# Patient Record
Sex: FEMALE | Race: WHITE | NOT HISPANIC OR LATINO | Employment: OTHER | URBAN - NONMETROPOLITAN AREA
[De-identification: names, ages, dates, MRNs, and addresses within clinical notes are randomized per-mention and may not be internally consistent; named-entity substitution may affect disease eponyms.]

---

## 2017-06-13 ENCOUNTER — TRANSCRIBE ORDERS (OUTPATIENT)
Dept: ADMINISTRATIVE | Facility: HOSPITAL | Age: 62
End: 2017-06-13

## 2017-06-13 ENCOUNTER — LAB (OUTPATIENT)
Dept: LAB | Facility: HOSPITAL | Age: 62
End: 2017-06-13
Attending: INTERNAL MEDICINE

## 2017-06-13 DIAGNOSIS — Z79.899 ENCOUNTER FOR LONG-TERM (CURRENT) USE OF HIGH-RISK MEDICATION: Primary | ICD-10-CM

## 2017-06-13 DIAGNOSIS — Z79.899 ENCOUNTER FOR LONG-TERM (CURRENT) USE OF HIGH-RISK MEDICATION: ICD-10-CM

## 2017-06-13 LAB
ALBUMIN SERPL-MCNC: 4 G/DL (ref 3.4–4.8)
ALBUMIN/GLOB SERPL: 1.4 G/DL (ref 1.5–2.5)
ALP SERPL-CCNC: 99 U/L (ref 35–104)
ALT SERPL W P-5'-P-CCNC: 46 U/L (ref 10–36)
ANION GAP SERPL CALCULATED.3IONS-SCNC: 3.7 MMOL/L (ref 3.6–11.2)
AST SERPL-CCNC: 47 U/L (ref 10–30)
BASOPHILS # BLD AUTO: 0.05 10*3/MM3 (ref 0–0.3)
BASOPHILS NFR BLD AUTO: 0.9 % (ref 0–2)
BILIRUB SERPL-MCNC: 0.2 MG/DL (ref 0.2–1.8)
BUN BLD-MCNC: 13 MG/DL (ref 7–21)
BUN/CREAT SERPL: 13.7 (ref 7–25)
CALCIUM SPEC-SCNC: 9.7 MG/DL (ref 7.7–10)
CHLORIDE SERPL-SCNC: 106 MMOL/L (ref 99–112)
CO2 SERPL-SCNC: 30.3 MMOL/L (ref 24.3–31.9)
CREAT BLD-MCNC: 0.95 MG/DL (ref 0.43–1.29)
DEPRECATED RDW RBC AUTO: 54.4 FL (ref 37–54)
EOSINOPHIL # BLD AUTO: 0.51 10*3/MM3 (ref 0–0.7)
EOSINOPHIL NFR BLD AUTO: 9.2 % (ref 0–5)
ERYTHROCYTE [DISTWIDTH] IN BLOOD BY AUTOMATED COUNT: 17.3 % (ref 11.5–14.5)
GFR SERPL CREATININE-BSD FRML MDRD: 60 ML/MIN/1.73
GLOBULIN UR ELPH-MCNC: 2.9 GM/DL
GLUCOSE BLD-MCNC: 78 MG/DL (ref 70–110)
HCT VFR BLD AUTO: 35 % (ref 37–47)
HGB BLD-MCNC: 11.4 G/DL (ref 12–16)
IMM GRANULOCYTES # BLD: 0.01 10*3/MM3 (ref 0–0.03)
IMM GRANULOCYTES NFR BLD: 0.2 % (ref 0–0.5)
LYMPHOCYTES # BLD AUTO: 3.02 10*3/MM3 (ref 1–3)
LYMPHOCYTES NFR BLD AUTO: 54.6 % (ref 21–51)
MCH RBC QN AUTO: 28.3 PG (ref 27–33)
MCHC RBC AUTO-ENTMCNC: 32.6 G/DL (ref 33–37)
MCV RBC AUTO: 86.8 FL (ref 80–94)
MONOCYTES # BLD AUTO: 0.56 10*3/MM3 (ref 0.1–0.9)
MONOCYTES NFR BLD AUTO: 10.1 % (ref 0–10)
NEUTROPHILS # BLD AUTO: 1.38 10*3/MM3 (ref 1.4–6.5)
NEUTROPHILS NFR BLD AUTO: 25 % (ref 30–70)
OSMOLALITY SERPL CALC.SUM OF ELEC: 278.4 MOSM/KG (ref 273–305)
PLATELET # BLD AUTO: 407 10*3/MM3 (ref 130–400)
PMV BLD AUTO: 10.3 FL (ref 6–10)
POTASSIUM BLD-SCNC: 3.9 MMOL/L (ref 3.5–5.3)
PROT SERPL-MCNC: 6.9 G/DL (ref 6–8)
RBC # BLD AUTO: 4.03 10*6/MM3 (ref 4.2–5.4)
SODIUM BLD-SCNC: 140 MMOL/L (ref 135–153)
WBC NRBC COR # BLD: 5.53 10*3/MM3 (ref 4.5–12.5)

## 2017-06-13 PROCEDURE — 36415 COLL VENOUS BLD VENIPUNCTURE: CPT

## 2017-06-13 PROCEDURE — 85025 COMPLETE CBC W/AUTO DIFF WBC: CPT | Performed by: INTERNAL MEDICINE

## 2017-06-13 PROCEDURE — 80053 COMPREHEN METABOLIC PANEL: CPT | Performed by: INTERNAL MEDICINE

## 2017-07-11 ENCOUNTER — LAB (OUTPATIENT)
Dept: LAB | Facility: HOSPITAL | Age: 62
End: 2017-07-11
Attending: INTERNAL MEDICINE

## 2017-07-11 ENCOUNTER — TRANSCRIBE ORDERS (OUTPATIENT)
Dept: ADMINISTRATIVE | Facility: HOSPITAL | Age: 62
End: 2017-07-11

## 2017-07-11 DIAGNOSIS — Z79.899 DRUG THERAPY: ICD-10-CM

## 2017-07-11 DIAGNOSIS — M05.79 RHEUMATOID ARTHRITIS, SEROPOSITIVE, MULTIPLE SITES (HCC): Primary | ICD-10-CM

## 2017-07-11 DIAGNOSIS — M05.79 RHEUMATOID ARTHRITIS, SEROPOSITIVE, MULTIPLE SITES (HCC): ICD-10-CM

## 2017-07-11 LAB
ALBUMIN SERPL-MCNC: 4.2 G/DL (ref 3.4–4.8)
ALBUMIN/GLOB SERPL: 1.6 G/DL (ref 1.5–2.5)
ALP SERPL-CCNC: 97 U/L (ref 35–104)
ALT SERPL W P-5'-P-CCNC: 22 U/L (ref 10–36)
ANION GAP SERPL CALCULATED.3IONS-SCNC: 3.6 MMOL/L (ref 3.6–11.2)
AST SERPL-CCNC: 27 U/L (ref 10–30)
BASOPHILS # BLD AUTO: 0.05 10*3/MM3 (ref 0–0.3)
BASOPHILS NFR BLD AUTO: 1.3 % (ref 0–2)
BILIRUB SERPL-MCNC: 0.2 MG/DL (ref 0.2–1.8)
BUN BLD-MCNC: 14 MG/DL (ref 7–21)
BUN/CREAT SERPL: 14.4 (ref 7–25)
CALCIUM SPEC-SCNC: 9.4 MG/DL (ref 7.7–10)
CHLORIDE SERPL-SCNC: 110 MMOL/L (ref 99–112)
CO2 SERPL-SCNC: 29.4 MMOL/L (ref 24.3–31.9)
CREAT BLD-MCNC: 0.97 MG/DL (ref 0.43–1.29)
DEPRECATED RDW RBC AUTO: 56.2 FL (ref 37–54)
EOSINOPHIL # BLD AUTO: 0.38 10*3/MM3 (ref 0–0.7)
EOSINOPHIL NFR BLD AUTO: 9.5 % (ref 0–5)
ERYTHROCYTE [DISTWIDTH] IN BLOOD BY AUTOMATED COUNT: 17.3 % (ref 11.5–14.5)
GFR SERPL CREATININE-BSD FRML MDRD: 59 ML/MIN/1.73
GLOBULIN UR ELPH-MCNC: 2.7 GM/DL
GLUCOSE BLD-MCNC: 96 MG/DL (ref 70–110)
HCT VFR BLD AUTO: 34.8 % (ref 37–47)
HGB BLD-MCNC: 11.2 G/DL (ref 12–16)
IMM GRANULOCYTES # BLD: 0 10*3/MM3 (ref 0–0.03)
IMM GRANULOCYTES NFR BLD: 0 % (ref 0–0.5)
LYMPHOCYTES # BLD AUTO: 1.74 10*3/MM3 (ref 1–3)
LYMPHOCYTES NFR BLD AUTO: 43.5 % (ref 21–51)
MCH RBC QN AUTO: 28.4 PG (ref 27–33)
MCHC RBC AUTO-ENTMCNC: 32.2 G/DL (ref 33–37)
MCV RBC AUTO: 88.1 FL (ref 80–94)
MONOCYTES # BLD AUTO: 0.39 10*3/MM3 (ref 0.1–0.9)
MONOCYTES NFR BLD AUTO: 9.8 % (ref 0–10)
NEUTROPHILS # BLD AUTO: 1.44 10*3/MM3 (ref 1.4–6.5)
NEUTROPHILS NFR BLD AUTO: 35.9 % (ref 30–70)
OSMOLALITY SERPL CALC.SUM OF ELEC: 285.3 MOSM/KG (ref 273–305)
PLATELET # BLD AUTO: 494 10*3/MM3 (ref 130–400)
PMV BLD AUTO: 10.2 FL (ref 6–10)
POTASSIUM BLD-SCNC: 4.5 MMOL/L (ref 3.5–5.3)
PROT SERPL-MCNC: 6.9 G/DL (ref 6–8)
RBC # BLD AUTO: 3.95 10*6/MM3 (ref 4.2–5.4)
SODIUM BLD-SCNC: 143 MMOL/L (ref 135–153)
WBC NRBC COR # BLD: 4 10*3/MM3 (ref 4.5–12.5)

## 2017-07-11 PROCEDURE — 80053 COMPREHEN METABOLIC PANEL: CPT | Performed by: INTERNAL MEDICINE

## 2017-07-11 PROCEDURE — 36415 COLL VENOUS BLD VENIPUNCTURE: CPT

## 2017-07-11 PROCEDURE — 85025 COMPLETE CBC W/AUTO DIFF WBC: CPT | Performed by: INTERNAL MEDICINE

## 2017-08-13 ENCOUNTER — APPOINTMENT (OUTPATIENT)
Dept: GENERAL RADIOLOGY | Facility: HOSPITAL | Age: 62
End: 2017-08-13

## 2017-08-13 ENCOUNTER — HOSPITAL ENCOUNTER (EMERGENCY)
Facility: HOSPITAL | Age: 62
Discharge: HOME | End: 2017-08-13
Attending: EMERGENCY MEDICINE | Admitting: EMERGENCY MEDICINE

## 2017-08-13 VITALS
HEART RATE: 75 BPM | DIASTOLIC BLOOD PRESSURE: 70 MMHG | SYSTOLIC BLOOD PRESSURE: 136 MMHG | RESPIRATION RATE: 18 BRPM | HEIGHT: 66 IN | WEIGHT: 191 LBS | TEMPERATURE: 98.3 F | BODY MASS INDEX: 30.7 KG/M2 | OXYGEN SATURATION: 98 %

## 2017-08-13 DIAGNOSIS — S69.91XA HAND INJURY, RIGHT, INITIAL ENCOUNTER: Primary | ICD-10-CM

## 2017-08-13 PROCEDURE — 73130 X-RAY EXAM OF HAND: CPT | Performed by: RADIOLOGY

## 2017-08-13 PROCEDURE — 25010000002 METHYLPREDNISOLONE PER 125 MG: Performed by: PHYSICIAN ASSISTANT

## 2017-08-13 PROCEDURE — 99283 EMERGENCY DEPT VISIT LOW MDM: CPT

## 2017-08-13 PROCEDURE — 73130 X-RAY EXAM OF HAND: CPT

## 2017-08-13 PROCEDURE — 96372 THER/PROPH/DIAG INJ SC/IM: CPT

## 2017-08-13 RX ORDER — METHYLPREDNISOLONE SODIUM SUCCINATE 125 MG/2ML
60 INJECTION, POWDER, LYOPHILIZED, FOR SOLUTION INTRAMUSCULAR; INTRAVENOUS ONCE
Status: COMPLETED | OUTPATIENT
Start: 2017-08-13 | End: 2017-08-13

## 2017-08-13 RX ADMIN — METHYLPREDNISOLONE SODIUM SUCCINATE 60 MG: 125 INJECTION, POWDER, FOR SOLUTION INTRAMUSCULAR; INTRAVENOUS at 16:45

## 2017-08-13 NOTE — ED NOTES
Patient reports she was cleaning her bath tub when she attempted to stand up and push herself up with her right hand. Patient reports pain in right hand and fingers since incident last night. Provider aware.      Maira Broussard RN  08/13/17 [id number]

## 2017-08-13 NOTE — ED PROVIDER NOTES
Subjective   Patient is a 62 y.o. female presenting with upper extremity pain.   History provided by:  Patient  Upper Extremity Issue   Location:  Hand  Hand location:  R hand  Injury: yes    Time since incident:  1 day  Mechanism of injury comment:  Pushing herself up from bathroom floor.   Pain details:     Quality:  Aching and throbbing    Radiates to:  Does not radiate    Severity:  Moderate    Onset quality:  Sudden    Timing:  Constant    Progression:  Worsening  Dislocation: no    Prior injury to area:  Yes (chronic osteo / rheumatoid arthritis. )  Relieved by:  Nothing  Ineffective treatments:  None tried  Associated symptoms: decreased range of motion and swelling    Associated symptoms: no fever        Review of Systems   Constitutional: Negative.  Negative for fever.   HENT: Negative.    Respiratory: Negative.    Cardiovascular: Negative.  Negative for chest pain.   Gastrointestinal: Negative.  Negative for abdominal pain.   Endocrine: Negative.    Genitourinary: Negative.  Negative for dysuria.   Skin: Negative.    Neurological: Negative.    Psychiatric/Behavioral: Negative.    All other systems reviewed and are negative.      Past Medical History:   Diagnosis Date   • Disease of thyroid gland    • Hiatal hernia    • Hyperlipidemia    • Injury of back    • Neuropathy        Allergies   Allergen Reactions   • Sulfa Antibiotics    • Ultram [Tramadol Hcl]        Past Surgical History:   Procedure Laterality Date   • BACK SURGERY     • BLADDER SURGERY     • HERNIA REPAIR     • HYSTERECTOMY     • NECK SURGERY         Family History   Problem Relation Age of Onset   • Breast cancer Mother    • Breast cancer Sister        Social History     Social History   • Marital status:      Spouse name: N/A   • Number of children: N/A   • Years of education: N/A     Social History Main Topics   • Smoking status: Never Smoker   • Smokeless tobacco: Not on file   • Alcohol use No   • Drug use: No   • Sexual  activity: Not on file     Other Topics Concern   • Not on file     Social History Narrative           Objective   Physical Exam   Constitutional: She is oriented to person, place, and time. She appears well-developed and well-nourished. No distress.   HENT:   Head: Normocephalic and atraumatic.   Nose: Nose normal.   Eyes: Conjunctivae and EOM are normal. Pupils are equal, round, and reactive to light.   Neck: Normal range of motion. Neck supple. No JVD present. No tracheal deviation present.   Cardiovascular: Normal rate, regular rhythm and normal heart sounds.    No murmur heard.  Pulmonary/Chest: Effort normal and breath sounds normal. No respiratory distress. She has no wheezes.   Abdominal: Soft. Bowel sounds are normal. There is no tenderness.   Musculoskeletal: She exhibits tenderness. She exhibits no edema or deformity.   Pt is tender to palpation to II III metacarpal, very mild discomfort to distal phalanx of II III right hand.    Neurological: She is alert and oriented to person, place, and time. No cranial nerve deficit.   Skin: Skin is warm and dry. No rash noted. She is not diaphoretic. No erythema. No pallor.   Psychiatric: She has a normal mood and affect. Her behavior is normal. Thought content normal.   Nursing note and vitals reviewed.      Procedures         ED Course  ED Course   Comment By Time   XR reviewed by Dr. Schmitt:  No apparent acute bony abnormality. SHANNON Baird 08/13 1615                  MDM  Number of Diagnoses or Management Options  minor     Amount and/or Complexity of Data Reviewed  Tests in the radiology section of CPT®: ordered and reviewed  Discuss the patient with other providers: yes    Risk of Complications, Morbidity, and/or Mortality  Presenting problems: low  Diagnostic procedures: low  Management options: low    Patient Progress  Patient progress: stable      Final diagnoses:   Hand injury, right, initial encounter            SHANNON Baird  08/13/17 1628

## 2017-08-18 ENCOUNTER — LAB (OUTPATIENT)
Dept: LAB | Facility: HOSPITAL | Age: 62
End: 2017-08-18
Attending: INTERNAL MEDICINE

## 2017-08-18 ENCOUNTER — TRANSCRIBE ORDERS (OUTPATIENT)
Dept: ADMINISTRATIVE | Facility: HOSPITAL | Age: 62
End: 2017-08-18

## 2017-08-18 DIAGNOSIS — M05.79 SEROPOSITIVE RHEUMATOID ARTHRITIS OF MULTIPLE SITES (HCC): ICD-10-CM

## 2017-08-18 DIAGNOSIS — Z79.899 POLYPHARMACY: ICD-10-CM

## 2017-08-18 DIAGNOSIS — M05.79 SEROPOSITIVE RHEUMATOID ARTHRITIS OF MULTIPLE SITES (HCC): Primary | ICD-10-CM

## 2017-08-18 LAB
ALBUMIN SERPL-MCNC: 4.5 G/DL (ref 3.4–4.8)
ALBUMIN/GLOB SERPL: 1.6 G/DL (ref 1.5–2.5)
ALP SERPL-CCNC: 103 U/L (ref 35–104)
ALT SERPL W P-5'-P-CCNC: 20 U/L (ref 10–36)
ANION GAP SERPL CALCULATED.3IONS-SCNC: 3.4 MMOL/L (ref 3.6–11.2)
AST SERPL-CCNC: 24 U/L (ref 10–30)
BASOPHILS # BLD AUTO: 0.03 10*3/MM3 (ref 0–0.3)
BASOPHILS NFR BLD AUTO: 0.6 % (ref 0–2)
BILIRUB SERPL-MCNC: 0.2 MG/DL (ref 0.2–1.8)
BUN BLD-MCNC: 13 MG/DL (ref 7–21)
BUN/CREAT SERPL: 15.1 (ref 7–25)
CALCIUM SPEC-SCNC: 9.2 MG/DL (ref 7.7–10)
CHLORIDE SERPL-SCNC: 108 MMOL/L (ref 99–112)
CO2 SERPL-SCNC: 25.6 MMOL/L (ref 24.3–31.9)
CREAT BLD-MCNC: 0.86 MG/DL (ref 0.43–1.29)
DEPRECATED RDW RBC AUTO: 54.3 FL (ref 37–54)
EOSINOPHIL # BLD AUTO: 0.15 10*3/MM3 (ref 0–0.7)
EOSINOPHIL NFR BLD AUTO: 3.1 % (ref 0–5)
ERYTHROCYTE [DISTWIDTH] IN BLOOD BY AUTOMATED COUNT: 17.1 % (ref 11.5–14.5)
GFR SERPL CREATININE-BSD FRML MDRD: 67 ML/MIN/1.73
GLOBULIN UR ELPH-MCNC: 2.8 GM/DL
GLUCOSE BLD-MCNC: 107 MG/DL (ref 70–110)
HCT VFR BLD AUTO: 35.2 % (ref 37–47)
HGB BLD-MCNC: 11.2 G/DL (ref 12–16)
IMM GRANULOCYTES # BLD: 0 10*3/MM3 (ref 0–0.03)
IMM GRANULOCYTES NFR BLD: 0 % (ref 0–0.5)
LYMPHOCYTES # BLD AUTO: 1.63 10*3/MM3 (ref 1–3)
LYMPHOCYTES NFR BLD AUTO: 33.3 % (ref 21–51)
MCH RBC QN AUTO: 27.9 PG (ref 27–33)
MCHC RBC AUTO-ENTMCNC: 31.8 G/DL (ref 33–37)
MCV RBC AUTO: 87.8 FL (ref 80–94)
MONOCYTES # BLD AUTO: 0.28 10*3/MM3 (ref 0.1–0.9)
MONOCYTES NFR BLD AUTO: 5.7 % (ref 0–10)
NEUTROPHILS # BLD AUTO: 2.8 10*3/MM3 (ref 1.4–6.5)
NEUTROPHILS NFR BLD AUTO: 57.3 % (ref 30–70)
OSMOLALITY SERPL CALC.SUM OF ELEC: 274.4 MOSM/KG (ref 273–305)
PLATELET # BLD AUTO: 571 10*3/MM3 (ref 130–400)
PMV BLD AUTO: 9.7 FL (ref 6–10)
POTASSIUM BLD-SCNC: 4.9 MMOL/L (ref 3.5–5.3)
PROT SERPL-MCNC: 7.3 G/DL (ref 6–8)
RBC # BLD AUTO: 4.01 10*6/MM3 (ref 4.2–5.4)
SODIUM BLD-SCNC: 137 MMOL/L (ref 135–153)
WBC NRBC COR # BLD: 4.89 10*3/MM3 (ref 4.5–12.5)

## 2017-08-18 PROCEDURE — 80053 COMPREHEN METABOLIC PANEL: CPT | Performed by: INTERNAL MEDICINE

## 2017-08-18 PROCEDURE — 85025 COMPLETE CBC W/AUTO DIFF WBC: CPT | Performed by: INTERNAL MEDICINE

## 2017-08-18 PROCEDURE — 36415 COLL VENOUS BLD VENIPUNCTURE: CPT

## 2017-09-25 ENCOUNTER — TRANSCRIBE ORDERS (OUTPATIENT)
Dept: ADMINISTRATIVE | Facility: HOSPITAL | Age: 62
End: 2017-09-25

## 2017-09-25 ENCOUNTER — LAB (OUTPATIENT)
Dept: LAB | Facility: HOSPITAL | Age: 62
End: 2017-09-25
Attending: INTERNAL MEDICINE

## 2017-09-25 DIAGNOSIS — M05.79 SEROPOSITIVE RHEUMATOID ARTHRITIS OF MULTIPLE SITES (HCC): ICD-10-CM

## 2017-09-25 DIAGNOSIS — Z79.899 DRUG THERAPY: ICD-10-CM

## 2017-09-25 DIAGNOSIS — M05.79 SEROPOSITIVE RHEUMATOID ARTHRITIS OF MULTIPLE SITES (HCC): Primary | ICD-10-CM

## 2017-09-25 LAB
ALBUMIN SERPL-MCNC: 4.1 G/DL (ref 3.4–4.8)
ALBUMIN/GLOB SERPL: 1.7 G/DL (ref 1.5–2.5)
ALP SERPL-CCNC: 98 U/L (ref 35–104)
ALT SERPL W P-5'-P-CCNC: 25 U/L (ref 10–36)
ANION GAP SERPL CALCULATED.3IONS-SCNC: 3.9 MMOL/L (ref 3.6–11.2)
AST SERPL-CCNC: 35 U/L (ref 10–30)
BASOPHILS # BLD AUTO: 0.08 10*3/MM3 (ref 0–0.3)
BASOPHILS NFR BLD AUTO: 1.3 % (ref 0–2)
BILIRUB SERPL-MCNC: 0.2 MG/DL (ref 0.2–1.8)
BUN BLD-MCNC: 14 MG/DL (ref 7–21)
BUN/CREAT SERPL: 13.6 (ref 7–25)
CALCIUM SPEC-SCNC: 9.2 MG/DL (ref 7.7–10)
CHLORIDE SERPL-SCNC: 107 MMOL/L (ref 99–112)
CO2 SERPL-SCNC: 29.1 MMOL/L (ref 24.3–31.9)
CREAT BLD-MCNC: 1.03 MG/DL (ref 0.43–1.29)
DEPRECATED RDW RBC AUTO: 54.6 FL (ref 37–54)
EOSINOPHIL # BLD AUTO: 0.3 10*3/MM3 (ref 0–0.7)
EOSINOPHIL NFR BLD AUTO: 4.9 % (ref 0–5)
ERYTHROCYTE [DISTWIDTH] IN BLOOD BY AUTOMATED COUNT: 16.7 % (ref 11.5–14.5)
GFR SERPL CREATININE-BSD FRML MDRD: 55 ML/MIN/1.73
GLOBULIN UR ELPH-MCNC: 2.4 GM/DL
GLUCOSE BLD-MCNC: 87 MG/DL (ref 70–110)
HCT VFR BLD AUTO: 32.1 % (ref 37–47)
HGB BLD-MCNC: 10.2 G/DL (ref 12–16)
IMM GRANULOCYTES # BLD: 0.01 10*3/MM3 (ref 0–0.03)
IMM GRANULOCYTES NFR BLD: 0.2 % (ref 0–0.5)
LYMPHOCYTES # BLD AUTO: 3.21 10*3/MM3 (ref 1–3)
LYMPHOCYTES NFR BLD AUTO: 52.2 % (ref 21–51)
MCH RBC QN AUTO: 28.5 PG (ref 27–33)
MCHC RBC AUTO-ENTMCNC: 31.8 G/DL (ref 33–37)
MCV RBC AUTO: 89.7 FL (ref 80–94)
MONOCYTES # BLD AUTO: 0.37 10*3/MM3 (ref 0.1–0.9)
MONOCYTES NFR BLD AUTO: 6 % (ref 0–10)
NEUTROPHILS # BLD AUTO: 2.18 10*3/MM3 (ref 1.4–6.5)
NEUTROPHILS NFR BLD AUTO: 35.4 % (ref 30–70)
OSMOLALITY SERPL CALC.SUM OF ELEC: 279.2 MOSM/KG (ref 273–305)
PLATELET # BLD AUTO: 461 10*3/MM3 (ref 130–400)
PMV BLD AUTO: 10.2 FL (ref 6–10)
POTASSIUM BLD-SCNC: 4.3 MMOL/L (ref 3.5–5.3)
PROT SERPL-MCNC: 6.5 G/DL (ref 6–8)
RBC # BLD AUTO: 3.58 10*6/MM3 (ref 4.2–5.4)
SODIUM BLD-SCNC: 140 MMOL/L (ref 135–153)
WBC NRBC COR # BLD: 6.15 10*3/MM3 (ref 4.5–12.5)

## 2017-09-25 PROCEDURE — 80053 COMPREHEN METABOLIC PANEL: CPT | Performed by: INTERNAL MEDICINE

## 2017-09-25 PROCEDURE — 36415 COLL VENOUS BLD VENIPUNCTURE: CPT

## 2017-09-25 PROCEDURE — 85025 COMPLETE CBC W/AUTO DIFF WBC: CPT | Performed by: INTERNAL MEDICINE

## 2017-10-24 ENCOUNTER — TRANSCRIBE ORDERS (OUTPATIENT)
Dept: ADMINISTRATIVE | Facility: HOSPITAL | Age: 62
End: 2017-10-24

## 2017-10-24 DIAGNOSIS — Z12.31 VISIT FOR SCREENING MAMMOGRAM: Primary | ICD-10-CM

## 2017-11-13 ENCOUNTER — HOSPITAL ENCOUNTER (OUTPATIENT)
Dept: MAMMOGRAPHY | Facility: HOSPITAL | Age: 62
Discharge: HOME | End: 2017-11-13
Admitting: OBSTETRICS & GYNECOLOGY

## 2017-11-13 DIAGNOSIS — Z12.31 VISIT FOR SCREENING MAMMOGRAM: ICD-10-CM

## 2017-11-13 PROCEDURE — 77063 BREAST TOMOSYNTHESIS BI: CPT | Performed by: RADIOLOGY

## 2017-11-13 PROCEDURE — G0202 SCR MAMMO BI INCL CAD: HCPCS

## 2017-11-13 PROCEDURE — G0202 SCR MAMMO BI INCL CAD: HCPCS | Performed by: RADIOLOGY

## 2017-11-13 PROCEDURE — 77063 BREAST TOMOSYNTHESIS BI: CPT

## 2018-01-02 ENCOUNTER — TRANSCRIBE ORDERS (OUTPATIENT)
Dept: ADMINISTRATIVE | Facility: HOSPITAL | Age: 63
End: 2018-01-02

## 2018-01-02 ENCOUNTER — LAB (OUTPATIENT)
Dept: LAB | Facility: HOSPITAL | Age: 63
End: 2018-01-02
Attending: INTERNAL MEDICINE

## 2018-01-02 DIAGNOSIS — Z79.899 ENCOUNTER FOR LONG-TERM (CURRENT) USE OF HIGH-RISK MEDICATION: ICD-10-CM

## 2018-01-02 DIAGNOSIS — Z79.899 ENCOUNTER FOR LONG-TERM (CURRENT) USE OF HIGH-RISK MEDICATION: Primary | ICD-10-CM

## 2018-01-02 LAB
ALBUMIN SERPL-MCNC: 4.3 G/DL (ref 3.4–4.8)
ALBUMIN/GLOB SERPL: 1.5 G/DL (ref 1.5–2.5)
ALP SERPL-CCNC: 104 U/L (ref 35–104)
ALT SERPL W P-5'-P-CCNC: 29 U/L (ref 10–36)
ANION GAP SERPL CALCULATED.3IONS-SCNC: 6.5 MMOL/L (ref 3.6–11.2)
AST SERPL-CCNC: 27 U/L (ref 10–30)
BASOPHILS # BLD AUTO: 0.07 10*3/MM3 (ref 0–0.3)
BASOPHILS NFR BLD AUTO: 1.1 % (ref 0–2)
BILIRUB SERPL-MCNC: 0.2 MG/DL (ref 0.2–1.8)
BUN BLD-MCNC: 14 MG/DL (ref 7–21)
BUN/CREAT SERPL: 14.7 (ref 7–25)
CALCIUM SPEC-SCNC: 9.2 MG/DL (ref 7.7–10)
CHLORIDE SERPL-SCNC: 109 MMOL/L (ref 99–112)
CO2 SERPL-SCNC: 25.5 MMOL/L (ref 24.3–31.9)
CREAT BLD-MCNC: 0.95 MG/DL (ref 0.43–1.29)
DEPRECATED RDW RBC AUTO: 56.4 FL (ref 37–54)
EOSINOPHIL # BLD AUTO: 0.26 10*3/MM3 (ref 0–0.7)
EOSINOPHIL NFR BLD AUTO: 4.1 % (ref 0–5)
ERYTHROCYTE [DISTWIDTH] IN BLOOD BY AUTOMATED COUNT: 17.4 % (ref 11.5–14.5)
GFR SERPL CREATININE-BSD FRML MDRD: 60 ML/MIN/1.73
GLOBULIN UR ELPH-MCNC: 2.8 GM/DL
GLUCOSE BLD-MCNC: 92 MG/DL (ref 70–110)
HCT VFR BLD AUTO: 34.9 % (ref 37–47)
HGB BLD-MCNC: 11.2 G/DL (ref 12–16)
IMM GRANULOCYTES # BLD: 0 10*3/MM3 (ref 0–0.03)
IMM GRANULOCYTES NFR BLD: 0 % (ref 0–0.5)
LYMPHOCYTES # BLD AUTO: 3.37 10*3/MM3 (ref 1–3)
LYMPHOCYTES NFR BLD AUTO: 52.9 % (ref 21–51)
MCH RBC QN AUTO: 28.4 PG (ref 27–33)
MCHC RBC AUTO-ENTMCNC: 32.1 G/DL (ref 33–37)
MCV RBC AUTO: 88.6 FL (ref 80–94)
MONOCYTES # BLD AUTO: 0.53 10*3/MM3 (ref 0.1–0.9)
MONOCYTES NFR BLD AUTO: 8.3 % (ref 0–10)
NEUTROPHILS # BLD AUTO: 2.14 10*3/MM3 (ref 1.4–6.5)
NEUTROPHILS NFR BLD AUTO: 33.6 % (ref 30–70)
OSMOLALITY SERPL CALC.SUM OF ELEC: 281.4 MOSM/KG (ref 273–305)
PLATELET # BLD AUTO: 411 10*3/MM3 (ref 130–400)
PMV BLD AUTO: 10.3 FL (ref 6–10)
POTASSIUM BLD-SCNC: 4.4 MMOL/L (ref 3.5–5.3)
PROT SERPL-MCNC: 7.1 G/DL (ref 6–8)
RBC # BLD AUTO: 3.94 10*6/MM3 (ref 4.2–5.4)
SODIUM BLD-SCNC: 141 MMOL/L (ref 135–153)
WBC NRBC COR # BLD: 6.37 10*3/MM3 (ref 4.5–12.5)

## 2018-01-02 PROCEDURE — 85025 COMPLETE CBC W/AUTO DIFF WBC: CPT

## 2018-01-02 PROCEDURE — 80053 COMPREHEN METABOLIC PANEL: CPT

## 2018-01-02 PROCEDURE — 36415 COLL VENOUS BLD VENIPUNCTURE: CPT

## 2018-03-09 ENCOUNTER — LAB (OUTPATIENT)
Dept: LAB | Facility: HOSPITAL | Age: 63
End: 2018-03-09
Attending: INTERNAL MEDICINE

## 2018-03-09 ENCOUNTER — TRANSCRIBE ORDERS (OUTPATIENT)
Dept: ADMINISTRATIVE | Facility: HOSPITAL | Age: 63
End: 2018-03-09

## 2018-03-09 DIAGNOSIS — Z79.899 ENCOUNTER FOR LONG-TERM (CURRENT) USE OF HIGH-RISK MEDICATION: ICD-10-CM

## 2018-03-09 DIAGNOSIS — Z79.899 ENCOUNTER FOR LONG-TERM (CURRENT) USE OF HIGH-RISK MEDICATION: Primary | ICD-10-CM

## 2018-03-09 LAB
ALBUMIN SERPL-MCNC: 4.2 G/DL (ref 3.4–4.8)
ALBUMIN/GLOB SERPL: 1.7 G/DL (ref 1.5–2.5)
ALP SERPL-CCNC: 101 U/L (ref 35–104)
ALT SERPL W P-5'-P-CCNC: 22 U/L (ref 10–36)
ANION GAP SERPL CALCULATED.3IONS-SCNC: 4.9 MMOL/L (ref 3.6–11.2)
AST SERPL-CCNC: 24 U/L (ref 10–30)
BASOPHILS # BLD AUTO: 0.06 10*3/MM3 (ref 0–0.3)
BASOPHILS NFR BLD AUTO: 1.1 % (ref 0–2)
BILIRUB SERPL-MCNC: 0.2 MG/DL (ref 0.2–1.8)
BUN BLD-MCNC: 14 MG/DL (ref 7–21)
BUN/CREAT SERPL: 15.1 (ref 7–25)
CALCIUM SPEC-SCNC: 9 MG/DL (ref 7.7–10)
CHLORIDE SERPL-SCNC: 108 MMOL/L (ref 99–112)
CO2 SERPL-SCNC: 25.1 MMOL/L (ref 24.3–31.9)
CREAT BLD-MCNC: 0.93 MG/DL (ref 0.43–1.29)
DEPRECATED RDW RBC AUTO: 60 FL (ref 37–54)
EOSINOPHIL # BLD AUTO: 0.44 10*3/MM3 (ref 0–0.7)
EOSINOPHIL NFR BLD AUTO: 7.7 % (ref 0–5)
ERYTHROCYTE [DISTWIDTH] IN BLOOD BY AUTOMATED COUNT: 18.8 % (ref 11.5–14.5)
GFR SERPL CREATININE-BSD FRML MDRD: 61 ML/MIN/1.73
GLOBULIN UR ELPH-MCNC: 2.5 GM/DL
GLUCOSE BLD-MCNC: 85 MG/DL (ref 70–110)
HCT VFR BLD AUTO: 34.2 % (ref 37–47)
HGB BLD-MCNC: 10.9 G/DL (ref 12–16)
IMM GRANULOCYTES # BLD: 0.01 10*3/MM3 (ref 0–0.03)
IMM GRANULOCYTES NFR BLD: 0.2 % (ref 0–0.5)
LYMPHOCYTES # BLD AUTO: 2.7 10*3/MM3 (ref 1–3)
LYMPHOCYTES NFR BLD AUTO: 47.5 % (ref 21–51)
MCH RBC QN AUTO: 28.7 PG (ref 27–33)
MCHC RBC AUTO-ENTMCNC: 31.9 G/DL (ref 33–37)
MCV RBC AUTO: 90 FL (ref 80–94)
MONOCYTES # BLD AUTO: 0.64 10*3/MM3 (ref 0.1–0.9)
MONOCYTES NFR BLD AUTO: 11.2 % (ref 0–10)
NEUTROPHILS # BLD AUTO: 1.84 10*3/MM3 (ref 1.4–6.5)
NEUTROPHILS NFR BLD AUTO: 32.3 % (ref 30–70)
OSMOLALITY SERPL CALC.SUM OF ELEC: 275.4 MOSM/KG (ref 273–305)
PLATELET # BLD AUTO: 383 10*3/MM3 (ref 130–400)
PMV BLD AUTO: 10.4 FL (ref 6–10)
POTASSIUM BLD-SCNC: 4.4 MMOL/L (ref 3.5–5.3)
PROT SERPL-MCNC: 6.7 G/DL (ref 6–8)
RBC # BLD AUTO: 3.8 10*6/MM3 (ref 4.2–5.4)
SODIUM BLD-SCNC: 138 MMOL/L (ref 135–153)
WBC NRBC COR # BLD: 5.69 10*3/MM3 (ref 4.5–12.5)

## 2018-03-09 PROCEDURE — 85025 COMPLETE CBC W/AUTO DIFF WBC: CPT

## 2018-03-09 PROCEDURE — 80053 COMPREHEN METABOLIC PANEL: CPT

## 2018-06-12 ENCOUNTER — TRANSCRIBE ORDERS (OUTPATIENT)
Dept: ADMINISTRATIVE | Facility: HOSPITAL | Age: 63
End: 2018-06-12

## 2018-06-12 ENCOUNTER — LAB (OUTPATIENT)
Dept: LAB | Facility: HOSPITAL | Age: 63
End: 2018-06-12
Attending: INTERNAL MEDICINE

## 2018-06-12 DIAGNOSIS — M06.4 INFLAMMATORY POLYARTHROPATHY (HCC): ICD-10-CM

## 2018-06-12 DIAGNOSIS — Z79.899 DRUG THERAPY: ICD-10-CM

## 2018-06-12 DIAGNOSIS — M06.4 INFLAMMATORY POLYARTHROPATHY (HCC): Primary | ICD-10-CM

## 2018-06-12 LAB
ALBUMIN SERPL-MCNC: 4.4 G/DL (ref 3.4–4.8)
ALBUMIN/GLOB SERPL: 1.8 G/DL (ref 1.5–2.5)
ALP SERPL-CCNC: 121 U/L (ref 35–104)
ALT SERPL W P-5'-P-CCNC: 29 U/L (ref 10–36)
ANION GAP SERPL CALCULATED.3IONS-SCNC: 6.8 MMOL/L (ref 3.6–11.2)
AST SERPL-CCNC: 29 U/L (ref 10–30)
BASOPHILS # BLD AUTO: 0.05 10*3/MM3 (ref 0–0.3)
BASOPHILS NFR BLD AUTO: 0.8 % (ref 0–2)
BILIRUB SERPL-MCNC: 0.2 MG/DL (ref 0.2–1.8)
BUN BLD-MCNC: 12 MG/DL (ref 7–21)
BUN/CREAT SERPL: 13.3 (ref 7–25)
CALCIUM SPEC-SCNC: 9.2 MG/DL (ref 7.7–10)
CHLORIDE SERPL-SCNC: 110 MMOL/L (ref 99–112)
CO2 SERPL-SCNC: 25.2 MMOL/L (ref 24.3–31.9)
CREAT BLD-MCNC: 0.9 MG/DL (ref 0.43–1.29)
DEPRECATED RDW RBC AUTO: 53.5 FL (ref 37–54)
EOSINOPHIL # BLD AUTO: 0.22 10*3/MM3 (ref 0–0.7)
EOSINOPHIL NFR BLD AUTO: 3.7 % (ref 0–5)
ERYTHROCYTE [DISTWIDTH] IN BLOOD BY AUTOMATED COUNT: 16.6 % (ref 11.5–14.5)
GFR SERPL CREATININE-BSD FRML MDRD: 64 ML/MIN/1.73
GLOBULIN UR ELPH-MCNC: 2.5 GM/DL
GLUCOSE BLD-MCNC: 87 MG/DL (ref 70–110)
HCT VFR BLD AUTO: 35.6 % (ref 37–47)
HGB BLD-MCNC: 11.7 G/DL (ref 12–16)
IMM GRANULOCYTES # BLD: 0.01 10*3/MM3 (ref 0–0.03)
IMM GRANULOCYTES NFR BLD: 0.2 % (ref 0–0.5)
LYMPHOCYTES # BLD AUTO: 2.24 10*3/MM3 (ref 1–3)
LYMPHOCYTES NFR BLD AUTO: 37.9 % (ref 21–51)
MCH RBC QN AUTO: 29.9 PG (ref 27–33)
MCHC RBC AUTO-ENTMCNC: 32.9 G/DL (ref 33–37)
MCV RBC AUTO: 91 FL (ref 80–94)
MONOCYTES # BLD AUTO: 0.54 10*3/MM3 (ref 0.1–0.9)
MONOCYTES NFR BLD AUTO: 9.1 % (ref 0–10)
NEUTROPHILS # BLD AUTO: 2.85 10*3/MM3 (ref 1.4–6.5)
NEUTROPHILS NFR BLD AUTO: 48.3 % (ref 30–70)
OSMOLALITY SERPL CALC.SUM OF ELEC: 282.2 MOSM/KG (ref 273–305)
PLATELET # BLD AUTO: 455 10*3/MM3 (ref 130–400)
PMV BLD AUTO: 10.7 FL (ref 6–10)
POTASSIUM BLD-SCNC: 4.3 MMOL/L (ref 3.5–5.3)
PROT SERPL-MCNC: 6.9 G/DL (ref 6–8)
RBC # BLD AUTO: 3.91 10*6/MM3 (ref 4.2–5.4)
SODIUM BLD-SCNC: 142 MMOL/L (ref 135–153)
WBC NRBC COR # BLD: 5.91 10*3/MM3 (ref 4.5–12.5)

## 2018-06-12 PROCEDURE — 80053 COMPREHEN METABOLIC PANEL: CPT

## 2018-06-12 PROCEDURE — 85025 COMPLETE CBC W/AUTO DIFF WBC: CPT

## 2018-06-12 PROCEDURE — 36415 COLL VENOUS BLD VENIPUNCTURE: CPT

## 2018-09-04 ENCOUNTER — APPOINTMENT (OUTPATIENT)
Dept: LAB | Facility: HOSPITAL | Age: 63
End: 2018-09-04
Attending: INTERNAL MEDICINE

## 2018-09-04 ENCOUNTER — TRANSCRIBE ORDERS (OUTPATIENT)
Dept: ADMINISTRATIVE | Facility: HOSPITAL | Age: 63
End: 2018-09-04

## 2018-09-04 DIAGNOSIS — M19.90 INFLAMMATORY ARTHRITIS: Primary | ICD-10-CM

## 2018-09-04 LAB
ALBUMIN SERPL-MCNC: 4.3 G/DL (ref 3.4–4.8)
ALBUMIN/GLOB SERPL: 1.7 G/DL (ref 1.5–2.5)
ALP SERPL-CCNC: 117 U/L (ref 35–104)
ALT SERPL W P-5'-P-CCNC: 51 U/L (ref 10–36)
ANION GAP SERPL CALCULATED.3IONS-SCNC: 5.9 MMOL/L (ref 3.6–11.2)
AST SERPL-CCNC: 38 U/L (ref 10–30)
BASOPHILS # BLD AUTO: 0.05 10*3/MM3 (ref 0–0.3)
BASOPHILS NFR BLD AUTO: 0.8 % (ref 0–2)
BILIRUB SERPL-MCNC: 0.2 MG/DL (ref 0.2–1.8)
BUN BLD-MCNC: 14 MG/DL (ref 7–21)
BUN/CREAT SERPL: 13.7 (ref 7–25)
CALCIUM SPEC-SCNC: 9.4 MG/DL (ref 7.7–10)
CHLORIDE SERPL-SCNC: 109 MMOL/L (ref 99–112)
CO2 SERPL-SCNC: 25.1 MMOL/L (ref 24.3–31.9)
CREAT BLD-MCNC: 1.02 MG/DL (ref 0.43–1.29)
DEPRECATED RDW RBC AUTO: 56.6 FL (ref 37–54)
EOSINOPHIL # BLD AUTO: 0.31 10*3/MM3 (ref 0–0.7)
EOSINOPHIL NFR BLD AUTO: 5.2 % (ref 0–5)
ERYTHROCYTE [DISTWIDTH] IN BLOOD BY AUTOMATED COUNT: 17.5 % (ref 11.5–14.5)
GFR SERPL CREATININE-BSD FRML MDRD: 55 ML/MIN/1.73
GLOBULIN UR ELPH-MCNC: 2.6 GM/DL
GLUCOSE BLD-MCNC: 86 MG/DL (ref 70–110)
HCT VFR BLD AUTO: 36.1 % (ref 37–47)
HGB BLD-MCNC: 11.8 G/DL (ref 12–16)
IMM GRANULOCYTES # BLD: 0 10*3/MM3 (ref 0–0.03)
IMM GRANULOCYTES NFR BLD: 0 % (ref 0–0.5)
LYMPHOCYTES # BLD AUTO: 2.82 10*3/MM3 (ref 1–3)
LYMPHOCYTES NFR BLD AUTO: 47 % (ref 21–51)
MCH RBC QN AUTO: 29.9 PG (ref 27–33)
MCHC RBC AUTO-ENTMCNC: 32.7 G/DL (ref 33–37)
MCV RBC AUTO: 91.4 FL (ref 80–94)
MONOCYTES # BLD AUTO: 0.59 10*3/MM3 (ref 0.1–0.9)
MONOCYTES NFR BLD AUTO: 9.8 % (ref 0–10)
NEUTROPHILS # BLD AUTO: 2.23 10*3/MM3 (ref 1.4–6.5)
NEUTROPHILS NFR BLD AUTO: 37.2 % (ref 30–70)
OSMOLALITY SERPL CALC.SUM OF ELEC: 279.2 MOSM/KG (ref 273–305)
PLATELET # BLD AUTO: 427 10*3/MM3 (ref 130–400)
PMV BLD AUTO: 10.5 FL (ref 6–10)
POTASSIUM BLD-SCNC: 4.2 MMOL/L (ref 3.5–5.3)
PROT SERPL-MCNC: 6.9 G/DL (ref 6–8)
RBC # BLD AUTO: 3.95 10*6/MM3 (ref 4.2–5.4)
SODIUM BLD-SCNC: 140 MMOL/L (ref 135–153)
WBC NRBC COR # BLD: 6 10*3/MM3 (ref 4.5–12.5)

## 2018-09-04 PROCEDURE — 36415 COLL VENOUS BLD VENIPUNCTURE: CPT | Performed by: INTERNAL MEDICINE

## 2018-09-04 PROCEDURE — 85025 COMPLETE CBC W/AUTO DIFF WBC: CPT | Performed by: INTERNAL MEDICINE

## 2018-09-04 PROCEDURE — 80053 COMPREHEN METABOLIC PANEL: CPT | Performed by: INTERNAL MEDICINE

## 2018-09-27 PROBLEM — J34.89 NASAL VALVE COLLAPSE: Status: ACTIVE | Noted: 2018-09-27

## 2018-09-27 PROBLEM — M95.0 NASAL VALVE COLLAPSE: Status: ACTIVE | Noted: 2018-09-27

## 2018-09-27 PROBLEM — J34.829 NASAL VALVE COLLAPSE: Status: ACTIVE | Noted: 2018-09-27

## 2018-09-27 PROBLEM — J34.2 NASAL SEPTAL DEVIATION: Status: ACTIVE | Noted: 2018-09-27

## 2018-09-27 PROBLEM — J34.3 HYPERTROPHY OF BOTH INFERIOR NASAL TURBINATES: Status: ACTIVE | Noted: 2018-09-27

## 2018-10-23 ENCOUNTER — APPOINTMENT (OUTPATIENT)
Dept: PREADMISSION TESTING | Facility: HOSPITAL | Age: 63
End: 2018-10-23

## 2018-10-23 LAB
ANION GAP SERPL CALCULATED.3IONS-SCNC: 7.3 MMOL/L (ref 3.6–11.2)
BUN BLD-MCNC: 17 MG/DL (ref 7–21)
BUN/CREAT SERPL: 17.7 (ref 7–25)
CALCIUM SPEC-SCNC: 9.5 MG/DL (ref 7.7–10)
CHLORIDE SERPL-SCNC: 111 MMOL/L (ref 99–112)
CO2 SERPL-SCNC: 22.7 MMOL/L (ref 24.3–31.9)
CREAT BLD-MCNC: 0.96 MG/DL (ref 0.43–1.29)
DEPRECATED RDW RBC AUTO: 54.5 FL (ref 37–54)
ERYTHROCYTE [DISTWIDTH] IN BLOOD BY AUTOMATED COUNT: 16.7 % (ref 11.5–14.5)
GFR SERPL CREATININE-BSD FRML MDRD: 59 ML/MIN/1.73
GLUCOSE BLD-MCNC: 60 MG/DL (ref 70–110)
HCT VFR BLD AUTO: 37.1 % (ref 37–47)
HGB BLD-MCNC: 12 G/DL (ref 12–16)
MCH RBC QN AUTO: 29.8 PG (ref 27–33)
MCHC RBC AUTO-ENTMCNC: 32.3 G/DL (ref 33–37)
MCV RBC AUTO: 92.1 FL (ref 80–94)
OSMOLALITY SERPL CALC.SUM OF ELEC: 280.7 MOSM/KG (ref 273–305)
PLATELET # BLD AUTO: 506 10*3/MM3 (ref 130–400)
PMV BLD AUTO: 10.5 FL (ref 6–10)
POTASSIUM BLD-SCNC: 3.5 MMOL/L (ref 3.5–5.3)
RBC # BLD AUTO: 4.03 10*6/MM3 (ref 4.2–5.4)
SODIUM BLD-SCNC: 141 MMOL/L (ref 135–153)
WBC NRBC COR # BLD: 5.82 10*3/MM3 (ref 4.5–12.5)

## 2018-10-23 PROCEDURE — 36415 COLL VENOUS BLD VENIPUNCTURE: CPT

## 2018-10-23 PROCEDURE — 85027 COMPLETE CBC AUTOMATED: CPT | Performed by: ANESTHESIOLOGY

## 2018-10-23 PROCEDURE — 80048 BASIC METABOLIC PNL TOTAL CA: CPT | Performed by: ANESTHESIOLOGY

## 2018-10-23 NOTE — DISCHARGE INSTRUCTIONS
0730-----10/24/2018    ARRIVAL TIME    TAKE the following medications the morning of surgery:  All heart or blood pressure medications    HOLD all diabetic medications the morning of surgery as ordered by physician.    Please discontinue all blood thinners and anticoagulants (except aspirin) prior to surgery as per your surgeon and cardiologist instructions.  Aspirin may be continued up to the day prior to surgery.         General Instructions:  · Do not eat or drink after midnight: includes water, mints, or gum. You may brush your teeth.  Dental appliances that are removable must be taken out day of surgery.  · Do not smoke, chew tobacco, or drink alcohol.  · Bring medications in original bottles, any inhalers and if applicable your C-PAP/BI-PAP machine.  · Bring any papers given to you in the doctor's office.  · Wear clean comfortable clothes and socks.  · Do not wear contact lenses or make-up. Bring a case for your glasses if applicable.  · Bring crutches or walker if applicable.  · Leave all other valuables and jewelry at home.    If you were given a blood bank ID arm band remember to bring it with you the day of surgery.    Preventing a Surgical Site Infection:  Shower the night before surgery (unless instructed other wise) using a fresh bar of anti-bacterial soap (such as Dial) and clean washcloth. Dry with a clean towel and dress in clean clothing.  For 2 to 3 days before surgery, avoid shaving with a razor near where you will have surgery because the razor can irritate skin and make it easier to develop an infection. Ask your surgeon if you will be receiving antibiotics prior to surgery.  Make sure you, your family, and all healthcare providers clear their hands with soap and water or an alcohol-based hand  before caring for you or your wound.  If at all possible, quit smoking as many days before surgery as you can.    Day of surgery:  Upon arrival, a Pre-op nurse and Anesthesiologist will review  your health history, obtain vital signs, and answer questions you may have. The only belongings needed at this time will be your home medications and if applicable your C-PAP/BI-PAP machine. If you are staying overnight your family can leave the rest of your belongings in the car and bring them to your room later. A Pre-op nurse will start an IV and you may receive medication in preparation for surgery, including something to help you relax. Your family will be able to see you in the Pre-op area. While you are in surgery your family should notify the waiting room  if they leave the waiting room area and provide a contact phone number.    Please be aware that surgery does come with discomfort. We want to make every effort to control your discomfort so please discuss any uncontrolled symptoms with your nurse. Your doctor will most likely have prescribed pain medications.    If you are going home after surgery you will receive individualized written care instructions before being discharged. A responsible adult must drive you to and from the hospital on the day of surgery and stay with you for 24 hours.    If you are staying overnight following surgery, you will be transported to your hospital room following the recovery period.  [facility] has all private rooms.    If you have any questions please call Pre-Admission Testing at [phone].  Deductibles and co-payments are collected on the day of service. Please be prepared to pay the required co-pay, deductible or deposit on the day of service as defined by your plan.

## 2018-10-24 ENCOUNTER — ANESTHESIA EVENT (OUTPATIENT)
Dept: PERIOP | Facility: HOSPITAL | Age: 63
End: 2018-10-24

## 2018-10-24 ENCOUNTER — ANESTHESIA (OUTPATIENT)
Dept: PERIOP | Facility: HOSPITAL | Age: 63
End: 2018-10-24

## 2018-10-24 ENCOUNTER — HOSPITAL ENCOUNTER (OUTPATIENT)
Facility: HOSPITAL | Age: 63
Setting detail: HOSPITAL OUTPATIENT SURGERY
Discharge: HOME | End: 2018-10-24
Attending: OTOLARYNGOLOGY | Admitting: OTOLARYNGOLOGY

## 2018-10-24 VITALS
WEIGHT: 184 LBS | DIASTOLIC BLOOD PRESSURE: 62 MMHG | OXYGEN SATURATION: 98 % | TEMPERATURE: 97.6 F | RESPIRATION RATE: 18 BRPM | SYSTOLIC BLOOD PRESSURE: 124 MMHG | HEIGHT: 66 IN | BODY MASS INDEX: 29.57 KG/M2 | HEART RATE: 68 BPM

## 2018-10-24 DIAGNOSIS — J34.3 HYPERTROPHY OF BOTH INFERIOR NASAL TURBINATES: ICD-10-CM

## 2018-10-24 DIAGNOSIS — J34.89 NASAL VALVE COLLAPSE: ICD-10-CM

## 2018-10-24 DIAGNOSIS — J34.2 NASAL SEPTAL DEVIATION: ICD-10-CM

## 2018-10-24 PROCEDURE — 25010000002 MIDAZOLAM PER 1 MG: Performed by: NURSE ANESTHETIST, CERTIFIED REGISTERED

## 2018-10-24 PROCEDURE — 25010000002 FENTANYL CITRATE (PF) 100 MCG/2ML SOLUTION: Performed by: NURSE ANESTHETIST, CERTIFIED REGISTERED

## 2018-10-24 PROCEDURE — 0

## 2018-10-24 PROCEDURE — C1889 IMPLANT/INSERT DEVICE, NOC: HCPCS

## 2018-10-24 PROCEDURE — 25010000002 ONDANSETRON PER 1 MG: Performed by: NURSE ANESTHETIST, CERTIFIED REGISTERED

## 2018-10-24 PROCEDURE — 25010000002 PROPOFOL 10 MG/ML EMULSION: Performed by: NURSE ANESTHETIST, CERTIFIED REGISTERED

## 2018-10-24 DEVICE — STNT NASL LATERA ABS 24MM PK/2: Type: IMPLANTABLE DEVICE | Site: NOSE | Status: FUNCTIONAL

## 2018-10-24 RX ORDER — IPRATROPIUM BROMIDE AND ALBUTEROL SULFATE 2.5; .5 MG/3ML; MG/3ML
3 SOLUTION RESPIRATORY (INHALATION) ONCE AS NEEDED
Status: DISCONTINUED | OUTPATIENT
Start: 2018-10-24 | End: 2018-10-24 | Stop reason: HOSPADM

## 2018-10-24 RX ORDER — ONDANSETRON 2 MG/ML
4 INJECTION INTRAMUSCULAR; INTRAVENOUS ONCE AS NEEDED
Status: DISCONTINUED | OUTPATIENT
Start: 2018-10-24 | End: 2018-10-24 | Stop reason: HOSPADM

## 2018-10-24 RX ORDER — SODIUM CHLORIDE 0.9 % (FLUSH) 0.9 %
3 SYRINGE (ML) INJECTION EVERY 12 HOURS SCHEDULED
Status: DISCONTINUED | OUTPATIENT
Start: 2018-10-24 | End: 2018-10-24 | Stop reason: HOSPADM

## 2018-10-24 RX ORDER — SODIUM CHLORIDE 0.9 % (FLUSH) 0.9 %
3-10 SYRINGE (ML) INJECTION AS NEEDED
Status: DISCONTINUED | OUTPATIENT
Start: 2018-10-24 | End: 2018-10-24 | Stop reason: HOSPADM

## 2018-10-24 RX ORDER — MIDAZOLAM HYDROCHLORIDE 1 MG/ML
INJECTION INTRAMUSCULAR; INTRAVENOUS AS NEEDED
Status: DISCONTINUED | OUTPATIENT
Start: 2018-10-24 | End: 2018-10-24 | Stop reason: SURG

## 2018-10-24 RX ORDER — PROPOFOL 10 MG/ML
VIAL (ML) INTRAVENOUS AS NEEDED
Status: DISCONTINUED | OUTPATIENT
Start: 2018-10-24 | End: 2018-10-24 | Stop reason: SURG

## 2018-10-24 RX ORDER — FENTANYL CITRATE 50 UG/ML
INJECTION, SOLUTION INTRAMUSCULAR; INTRAVENOUS AS NEEDED
Status: DISCONTINUED | OUTPATIENT
Start: 2018-10-24 | End: 2018-10-24 | Stop reason: SURG

## 2018-10-24 RX ORDER — SODIUM CHLORIDE, SODIUM LACTATE, POTASSIUM CHLORIDE, CALCIUM CHLORIDE 600; 310; 30; 20 MG/100ML; MG/100ML; MG/100ML; MG/100ML
125 INJECTION, SOLUTION INTRAVENOUS CONTINUOUS
Status: DISCONTINUED | OUTPATIENT
Start: 2018-10-24 | End: 2018-10-24 | Stop reason: HOSPADM

## 2018-10-24 RX ORDER — FENTANYL CITRATE 50 UG/ML
50 INJECTION, SOLUTION INTRAMUSCULAR; INTRAVENOUS
Status: COMPLETED | OUTPATIENT
Start: 2018-10-24 | End: 2018-10-24

## 2018-10-24 RX ORDER — HYDROCODONE BITARTRATE AND ACETAMINOPHEN 5; 325 MG/1; MG/1
1-2 TABLET ORAL EVERY 6 HOURS PRN
Qty: 30 TABLET | Refills: 0 | Status: SHIPPED | OUTPATIENT
Start: 2018-10-24 | End: 2019-08-09

## 2018-10-24 RX ORDER — LIDOCAINE HYDROCHLORIDE AND EPINEPHRINE 10; 10 MG/ML; UG/ML
INJECTION, SOLUTION INFILTRATION; PERINEURAL AS NEEDED
Status: DISCONTINUED | OUTPATIENT
Start: 2018-10-24 | End: 2018-10-24 | Stop reason: HOSPADM

## 2018-10-24 RX ORDER — FAMOTIDINE 10 MG/ML
INJECTION, SOLUTION INTRAVENOUS AS NEEDED
Status: DISCONTINUED | OUTPATIENT
Start: 2018-10-24 | End: 2018-10-24 | Stop reason: SURG

## 2018-10-24 RX ORDER — ONDANSETRON 2 MG/ML
INJECTION INTRAMUSCULAR; INTRAVENOUS AS NEEDED
Status: DISCONTINUED | OUTPATIENT
Start: 2018-10-24 | End: 2018-10-24 | Stop reason: SURG

## 2018-10-24 RX ORDER — SODIUM CHLORIDE 9 MG/ML
INJECTION, SOLUTION INTRAVENOUS AS NEEDED
Status: DISCONTINUED | OUTPATIENT
Start: 2018-10-24 | End: 2018-10-24 | Stop reason: HOSPADM

## 2018-10-24 RX ORDER — OXYMETAZOLINE HYDROCHLORIDE 0.05 G/100ML
2 SPRAY NASAL ONCE
Status: COMPLETED | OUTPATIENT
Start: 2018-10-24 | End: 2018-10-24

## 2018-10-24 RX ORDER — MEPERIDINE HYDROCHLORIDE 25 MG/ML
12.5 INJECTION INTRAMUSCULAR; INTRAVENOUS; SUBCUTANEOUS
Status: DISCONTINUED | OUTPATIENT
Start: 2018-10-24 | End: 2018-10-24 | Stop reason: HOSPADM

## 2018-10-24 RX ADMIN — FENTANYL CITRATE 100 MCG: 50 INJECTION INTRAMUSCULAR; INTRAVENOUS at 10:22

## 2018-10-24 RX ADMIN — FENTANYL CITRATE 50 MCG: 50 INJECTION INTRAMUSCULAR; INTRAVENOUS at 11:38

## 2018-10-24 RX ADMIN — Medication 2 SPRAY: at 09:35

## 2018-10-24 RX ADMIN — PROPOFOL 150 MG: 10 INJECTION, EMULSION INTRAVENOUS at 10:26

## 2018-10-24 RX ADMIN — ONDANSETRON 4 MG: 2 INJECTION, SOLUTION INTRAMUSCULAR; INTRAVENOUS at 10:22

## 2018-10-24 RX ADMIN — MIDAZOLAM HYDROCHLORIDE 2 MG: 1 INJECTION, SOLUTION INTRAMUSCULAR; INTRAVENOUS at 10:22

## 2018-10-24 RX ADMIN — FAMOTIDINE 20 MG: 10 INJECTION, SOLUTION INTRAVENOUS at 10:22

## 2018-10-24 RX ADMIN — FENTANYL CITRATE 50 MCG: 50 INJECTION INTRAMUSCULAR; INTRAVENOUS at 11:43

## 2018-10-24 RX ADMIN — SODIUM CHLORIDE, POTASSIUM CHLORIDE, SODIUM LACTATE AND CALCIUM CHLORIDE: 600; 310; 30; 20 INJECTION, SOLUTION INTRAVENOUS at 10:22

## 2018-10-24 NOTE — ANESTHESIA POSTPROCEDURE EVALUATION
Patient: Winter Ayers    Procedure Summary     Date:  10/24/18 Room / Location:  [facility] OR 09 /  [facility] OR    Anesthesia Start:  1023 Anesthesia Stop:  1113    Procedure:  SEPTOPLASTY,  INFERIOR TURBINATE lateralization  WITH LATERA IMPLANTS (Bilateral Nose) Diagnosis:       Nasal septal deviation      Nasal valve collapse      Hypertrophy of both inferior nasal turbinates      (Nasal septal deviation [J34.2])      (Nasal valve collapse [J34.89])      (Hypertrophy of both inferior nasal turbinates [J34.3])    Surgeon:  Josh Foy MD Provider:  Jeyson Wooten MD    Anesthesia Type:  general ASA Status:  3          Anesthesia Type: general  Last vitals  BP   124/62 (10/24/18 1225)   Temp   97.6 °F (36.4 °C) (10/24/18 1158)   Pulse   68 (10/24/18 1225)   Resp   18 (10/24/18 1225)     SpO2   98 % (10/24/18 1225)     Post Anesthesia Care and Evaluation    Patient location during evaluation: PHASE II  Patient participation: complete - patient participated  Level of consciousness: awake and alert  Pain score: 1  Pain management: adequate  Airway patency: patent  Anesthetic complications: No anesthetic complications  PONV Status: controlled  Cardiovascular status: acceptable  Respiratory status: acceptable  Hydration status: acceptable

## 2018-10-24 NOTE — ANESTHESIA PROCEDURE NOTES
Airway  Urgency: elective    Date/Time: 10/24/2018 10:40 AM  Airway not difficult    General Information and Staff    Patient location during procedure: OR  Anesthesiologist: ARNALDO ARREOLA  CRNA: NEHEMIAH HINES    Indications and Patient Condition  Indications for airway management: airway protection    Preoxygenated: yes  MILS maintained throughout  Mask difficulty assessment: 1 - vent by mask    Final Airway Details  Final airway type: endotracheal airway      Successful airway: LAURA tube  Cuffed: yes   Successful intubation technique: video laryngoscopy and exchange catheter  Endotracheal tube insertion site: oral  Cormack-Lehane Classification: grade IIa - partial view of glottis  Placement verified by: chest auscultation   Number of attempts at approach: 1

## 2018-10-24 NOTE — ANESTHESIA PREPROCEDURE EVALUATION
Anesthesia Evaluation     Patient summary reviewed and Nursing notes reviewed   NPO Solid Status: > 8 hours  NPO Liquid Status: > 8 hours           Airway   Mallampati: II  TM distance: >3 FB  Neck ROM: full  no difficulty expected  Dental    (+) poor dentition        Pulmonary - negative pulmonary ROS   (+) decreased breath sounds,   Cardiovascular - negative cardio ROS    Rhythm: regular  Rate: normal        Neuro/Psych- negative ROS  GI/Hepatic/Renal/Endo - negative ROS     Musculoskeletal     (+) chronic pain,   Abdominal    Substance History - negative use     OB/GYN negative ob/gyn ROS         Other - negative ROS                       Anesthesia Plan    ASA 3     general     intravenous induction   Anesthetic plan, all risks, benefits, and alternatives have been provided, discussed and informed consent has been obtained with: patient.  Use of blood products discussed with patient .   Plan discussed with CRNA.

## 2018-12-31 ENCOUNTER — HOSPITAL ENCOUNTER (OUTPATIENT)
Dept: MAMMOGRAPHY | Facility: HOSPITAL | Age: 63
Discharge: HOME | End: 2018-12-31
Admitting: OBSTETRICS & GYNECOLOGY

## 2018-12-31 DIAGNOSIS — Z12.39 SCREENING BREAST EXAMINATION: ICD-10-CM

## 2018-12-31 PROCEDURE — 77067 SCR MAMMO BI INCL CAD: CPT | Performed by: RADIOLOGY

## 2018-12-31 PROCEDURE — 77063 BREAST TOMOSYNTHESIS BI: CPT | Performed by: RADIOLOGY

## 2018-12-31 PROCEDURE — 77067 SCR MAMMO BI INCL CAD: CPT

## 2018-12-31 PROCEDURE — 77063 BREAST TOMOSYNTHESIS BI: CPT

## 2019-03-07 ENCOUNTER — APPOINTMENT (OUTPATIENT)
Dept: MAMMOGRAPHY | Facility: HOSPITAL | Age: 64
End: 2019-03-07

## 2019-04-18 ENCOUNTER — HOSPITAL ENCOUNTER (EMERGENCY)
Facility: HOSPITAL | Age: 64
Discharge: HOME | End: 2019-04-18
Admitting: EMERGENCY MEDICINE

## 2019-04-18 VITALS
RESPIRATION RATE: 18 BRPM | OXYGEN SATURATION: 98 % | TEMPERATURE: 98.6 F | DIASTOLIC BLOOD PRESSURE: 78 MMHG | WEIGHT: 174 LBS | SYSTOLIC BLOOD PRESSURE: 128 MMHG | HEART RATE: 81 BPM | HEIGHT: 66 IN | BODY MASS INDEX: 27.97 KG/M2

## 2019-04-18 DIAGNOSIS — J06.9 UPPER RESPIRATORY TRACT INFECTION, UNSPECIFIED TYPE: Primary | ICD-10-CM

## 2019-04-18 LAB
FLUAV AG NPH QL: NEGATIVE
FLUBV AG NPH QL IA: NEGATIVE
S PYO AG THROAT QL: NEGATIVE

## 2019-04-18 PROCEDURE — 87804 INFLUENZA ASSAY W/OPTIC: CPT | Performed by: NURSE PRACTITIONER

## 2019-04-18 PROCEDURE — 87081 CULTURE SCREEN ONLY: CPT | Performed by: NURSE PRACTITIONER

## 2019-04-18 PROCEDURE — 99283 EMERGENCY DEPT VISIT LOW MDM: CPT

## 2019-04-18 PROCEDURE — 87880 STREP A ASSAY W/OPTIC: CPT | Performed by: NURSE PRACTITIONER

## 2019-04-18 RX ORDER — AMOXICILLIN AND CLAVULANATE POTASSIUM 875; 125 MG/1; MG/1
1 TABLET, FILM COATED ORAL 2 TIMES DAILY
Qty: 20 TABLET | Refills: 0 | Status: SHIPPED | OUTPATIENT
Start: 2019-04-18 | End: 2019-08-09

## 2019-04-18 RX ORDER — AMOXICILLIN AND CLAVULANATE POTASSIUM 875; 125 MG/1; MG/1
1 TABLET, FILM COATED ORAL ONCE
Status: COMPLETED | OUTPATIENT
Start: 2019-04-18 | End: 2019-04-18

## 2019-04-18 RX ADMIN — AMOXICILLIN AND CLAVULANATE POTASSIUM 1 TABLET: 875; 125 TABLET, FILM COATED ORAL at 21:01

## 2019-04-21 LAB — BACTERIA SPEC AEROBE CULT: NORMAL

## 2019-04-23 ENCOUNTER — HOSPITAL ENCOUNTER (OUTPATIENT)
Dept: GENERAL RADIOLOGY | Facility: HOSPITAL | Age: 64
Discharge: HOME | End: 2019-04-23
Admitting: INTERNAL MEDICINE

## 2019-04-23 ENCOUNTER — TRANSCRIBE ORDERS (OUTPATIENT)
Dept: ADMINISTRATIVE | Facility: HOSPITAL | Age: 64
End: 2019-04-23

## 2019-04-23 DIAGNOSIS — R05.9 COUGH: ICD-10-CM

## 2019-04-23 DIAGNOSIS — R05.9 COUGH: Primary | ICD-10-CM

## 2019-04-23 PROCEDURE — 71046 X-RAY EXAM CHEST 2 VIEWS: CPT | Performed by: RADIOLOGY

## 2019-04-23 PROCEDURE — 71046 X-RAY EXAM CHEST 2 VIEWS: CPT

## 2019-07-31 ENCOUNTER — TRANSCRIBE ORDERS (OUTPATIENT)
Dept: INFUSION THERAPY | Facility: HOSPITAL | Age: 64
End: 2019-07-31

## 2019-07-31 DIAGNOSIS — M15.0 PRIMARY GENERALIZED HYPERTROPHIC OSTEOARTHROSIS: Primary | ICD-10-CM

## 2019-07-31 DIAGNOSIS — M06.09 RHEUMATOID ARTHRITIS OF MULTIPLE SITES WITHOUT RHEUMATOID FACTOR (HCC): ICD-10-CM

## 2019-07-31 DIAGNOSIS — M47.816 SPONDYLOSIS WITHOUT MYELOPATHY OR RADICULOPATHY, LUMBAR REGION: ICD-10-CM

## 2019-08-09 ENCOUNTER — HOSPITAL ENCOUNTER (OUTPATIENT)
Dept: INFUSION THERAPY | Facility: HOSPITAL | Age: 64
Setting detail: INFUSION SERIES
Discharge: HOME | End: 2019-08-09

## 2019-08-09 VITALS
BODY MASS INDEX: 29.09 KG/M2 | HEART RATE: 54 BPM | DIASTOLIC BLOOD PRESSURE: 67 MMHG | TEMPERATURE: 97.6 F | HEIGHT: 66 IN | SYSTOLIC BLOOD PRESSURE: 132 MMHG | OXYGEN SATURATION: 98 % | WEIGHT: 181 LBS | RESPIRATION RATE: 16 BRPM

## 2019-08-09 DIAGNOSIS — M06.09 RHEUMATOID ARTHRITIS OF MULTIPLE SITES WITHOUT RHEUMATOID FACTOR (HCC): Primary | ICD-10-CM

## 2019-08-09 PROCEDURE — 63710000001 DIPHENHYDRAMINE PER 50 MG: Performed by: INTERNAL MEDICINE

## 2019-08-09 PROCEDURE — 96413 CHEMO IV INFUSION 1 HR: CPT

## 2019-08-09 PROCEDURE — 25010000002 INFLIXIMAB PER 10 MG: Performed by: INTERNAL MEDICINE

## 2019-08-09 PROCEDURE — 96415 CHEMO IV INFUSION ADDL HR: CPT

## 2019-08-09 RX ORDER — DIPHENHYDRAMINE HCL 25 MG
25 CAPSULE ORAL ONCE
Status: COMPLETED | OUTPATIENT
Start: 2019-08-09 | End: 2019-08-09

## 2019-08-09 RX ORDER — ERGOCALCIFEROL 1.25 MG/1
50000 CAPSULE ORAL
Status: ON HOLD | COMMUNITY
End: 2022-07-26

## 2019-08-09 RX ORDER — DIPHENHYDRAMINE HCL 25 MG
25 CAPSULE ORAL ONCE
Status: CANCELLED
Start: 2019-08-09 | End: 2019-08-09

## 2019-08-09 RX ORDER — FOLIC ACID 1 MG/1
1 TABLET ORAL DAILY
Status: ON HOLD | COMMUNITY
End: 2022-07-26

## 2019-08-09 RX ORDER — ACETAMINOPHEN 500 MG
1000 TABLET ORAL ONCE
Status: DISCONTINUED | OUTPATIENT
Start: 2019-08-09 | End: 2019-08-09

## 2019-08-09 RX ORDER — OXYBUTYNIN CHLORIDE 5 MG/1
5 TABLET ORAL 2 TIMES DAILY
Status: ON HOLD | COMMUNITY
End: 2022-07-26

## 2019-08-09 RX ORDER — ACETAMINOPHEN 500 MG
1000 TABLET ORAL ONCE
Status: CANCELLED
Start: 2019-08-09 | End: 2019-08-09

## 2019-08-09 RX ORDER — DICYCLOMINE HCL 20 MG
20 TABLET ORAL 3 TIMES DAILY
Status: ON HOLD | COMMUNITY
End: 2022-07-26

## 2019-08-09 RX ORDER — ACETAMINOPHEN 325 MG/1
975 TABLET ORAL ONCE
Status: COMPLETED | OUTPATIENT
Start: 2019-08-09 | End: 2019-08-09

## 2019-08-09 RX ORDER — PHENOL 1.4 %
600 AEROSOL, SPRAY (ML) MUCOUS MEMBRANE DAILY
Status: ON HOLD | COMMUNITY
End: 2022-07-26

## 2019-08-09 RX ORDER — ALENDRONATE SODIUM 70 MG/1
70 TABLET ORAL
Status: ON HOLD | COMMUNITY
End: 2022-07-26

## 2019-08-09 RX ORDER — ZOLPIDEM TARTRATE 10 MG/1
10 TABLET ORAL NIGHTLY PRN
Status: ON HOLD | COMMUNITY
End: 2022-07-26

## 2019-08-09 RX ORDER — PRAVASTATIN SODIUM 40 MG
40 TABLET ORAL DAILY
Status: ON HOLD | COMMUNITY
End: 2022-07-26

## 2019-08-09 RX ORDER — HYDROCODONE BITARTRATE AND ACETAMINOPHEN 10; 325 MG/1; MG/1
1 TABLET ORAL EVERY 8 HOURS PRN
Status: ON HOLD | COMMUNITY
End: 2022-07-26

## 2019-08-09 RX ORDER — HYDROXYCHLOROQUINE SULFATE 200 MG/1
200 TABLET, FILM COATED ORAL DAILY
Status: ON HOLD | COMMUNITY
End: 2022-07-26

## 2019-08-09 RX ADMIN — ACETAMINOPHEN 975 MG: 325 TABLET ORAL at 10:48

## 2019-08-09 RX ADMIN — INFLIXIMAB 250 MG: 100 INJECTION, POWDER, LYOPHILIZED, FOR SOLUTION INTRAVENOUS at 11:19

## 2019-08-09 RX ADMIN — DIPHENHYDRAMINE HYDROCHLORIDE 25 MG: 25 CAPSULE ORAL at 10:47

## 2019-08-09 NOTE — PATIENT INSTRUCTIONS
Infliximab injection  What is this medicine?  INFLIXIMAB (in FLIX i mab) is used to treat Crohn's disease and ulcerative colitis. It is also used to treat ankylosing spondylitis, plaque psoriasis, and some forms of arthritis.  This medicine may be used for other purposes; ask your health care provider or pharmacist if you have questions.  COMMON BRAND NAME(S): INFLECTRA, Remicade, RENFLEXIS  What should I tell my health care provider before I take this medicine?  They need to know if you have any of these conditions:  -cancer  -current or past resident of [facility]  -diabetes  -exposure to tuberculosis  -Guillain-[place]land syndrome  -heart failure  -hepatitis or liver disease  -immune system problems  -infection  -lung or breathing disease, like COPD  -multiple sclerosis  -receiving phototherapy for the skin  -seizure disorder  -an unusual or allergic reaction to infliximab, mouse proteins, other medicines, foods, dyes, or preservatives  -pregnant or trying to get pregnant  -breast-feeding  How should I use this medicine?  This medicine is for injection into a vein. It is usually given by a health care professional in a hospital or clinic setting.  A special MedGuide will be given to you by the pharmacist with each prescription and refill. Be sure to read this information carefully each time.  Talk to your pediatrician regarding the use of this medicine in children. While this drug may be prescribed for children as young as 6 years of age for selected conditions, precautions do apply.  Overdosage: If you think you have taken too much of this medicine contact a poison control center or emergency room at once.  NOTE: This medicine is only for you. Do not share this medicine with others.  What if I miss a dose?  It is important not to miss your dose. Call your doctor or health care professional if you are unable to keep an appointment.  What may interact with this medicine?  Do not take this  medicine with any of the following medications:  -biologic medicines such as abatacept, adalimumab, anakinra, certolizumab, etanercept, golimumab, rituximab, secukinumab, tocilizumab, tofactinib, ustekinumab  -live vaccines  This list may not describe all possible interactions. Give your health care provider a list of all the medicines, herbs, non-prescription drugs, or dietary supplements you use. Also tell them if you smoke, drink alcohol, or use illegal drugs. Some items may interact with your medicine.  What should I watch for while using this medicine?  Your condition will be monitored carefully while you are receiving this medicine. Visit your doctor or health care professional for regular checks on your progress. You may need blood work done while you are taking this medicine. Before beginning therapy, your doctor may do a test to see if you have been exposed to tuberculosis.  Call your doctor or health care professional for advice if you get a fever, chills or sore throat, or other symptoms of a cold or flu. Do not treat yourself. This drug decreases your body's ability to fight infections. Try to avoid being around people who are sick.  This medicine may make the symptoms of heart failure worse in some patients. If you notice symptoms such as increased shortness of breath or swelling of the ankles or legs, contact your health care provider right away.  If you are going to have surgery or dental work, tell your health care professional or dentist that you have received this medicine.  If you take this medicine for plaque psoriasis, stay out of the sun. If you cannot avoid being in the sun, wear protective clothing and use sunscreen. Do not use sun lamps or tanning beds/booths.  Talk to your doctor about your risk of cancer. You may be more at risk for certain types of cancers if you take this medicine.  What side effects may I notice from receiving this medicine?  Side effects that you should report to your  doctor or health care professional as soon as possible:  -allergic reactions like skin rash, itching or hives, swelling of the face, lips, or tongue  -breathing problems  -changes in vision  -chest pain  -fever or chills, usually related to the infusion  -joint pain  -pain, tingling, numbness in the hands or feet  -redness, blistering, peeling or loosening of the skin, including inside the mouth  -seizures  -signs of infection - fever or chills, cough, sore throat, flu-like symptoms, pain or difficulty passing urine  -signs and symptoms of liver injury like dark yellow or brown urine; general ill feeling; light-colored stools; loss of appetite; nausea; right upper belly pain; unusually weak or tired; yellowing of the eyes or skin  -signs and symptoms of a stroke like changes in vision; confusion; trouble speaking or understanding; severe headaches; sudden numbness or weakness of the face, arm or leg; trouble walking; dizziness; loss of balance or coordination  -swelling of the ankles, feet, or hands  -swollen lymph nodes in the neck, underarm, or groin areas  -unusual bleeding or bruising  -unusually weak or tired  Side effects that usually do not require medical attention (report to your doctor or health care professional if they continue or are bothersome):  -headache  -nausea  -stomach pain  -upset stomach  This list may not describe all possible side effects. Call your doctor for medical advice about side effects. You may report side effects to FDA at 6-042-FDA-7853.  Where should I keep my medicine?  This drug is given in a hospital or clinic and will not be stored at home.  NOTE: This sheet is a summary. It may not cover all possible information. If you have questions about this medicine, talk to your doctor, pharmacist, or health care provider.  © 2019 [employer] Standard (2018-01-17 13:45:32)  Acetaminophen tablets or caplets  What is this medicine?  ACETAMINOPHEN (a set a HUNTER herminio fen) is a pain reliever.  It is used to treat mild pain and fever.  This medicine may be used for other purposes; ask your health care provider or pharmacist if you have questions.  COMMON BRAND NAME(S): Aceta, Actamin, Anacin Aspirin Free, Genapap, Genebs, Mapap, Pain & Fever, Pain and Fever, PAIN RELIEF, PAIN RELIEF Extra Strength, Pain Reliever, Panadol, PHARBETOL, Q-Pap, Q-Pap Extra Strength, Tylenol, Tylenol CrushableTablet, Tylenol Extra Strength, XS No Aspirin, XS Pain Reliever  What should I tell my health care provider before I take this medicine?  They need to know if you have any of these conditions:  -if you often drink alcohol  -liver disease  -an unusual or allergic reaction to acetaminophen, other medicines, foods, dyes, or preservatives  -pregnant or trying to get pregnant  -breast-feeding  How should I use this medicine?  Take this medicine by mouth with a glass of water. Follow the directions on the package or prescription label. Take your medicine at regular intervals. Do not take your medicine more often than directed.  Talk to your pediatrician regarding the use of this medicine in children. While this drug may be prescribed for children as young as 6 years of age for selected conditions, precautions do apply.  Overdosage: If you think you have taken too much of this medicine contact a poison control center or emergency room at once.  NOTE: This medicine is only for you. Do not share this medicine with others.  What if I miss a dose?  If you miss a dose, take it as soon as you can. If it is almost time for your next dose, take only that dose. Do not take double or extra doses.  What may interact with this medicine?  -alcohol  -imatinib  -isoniazid  -other medicines with acetaminophen  This list may not describe all possible interactions. Give your health care provider a list of all the medicines, herbs, non-prescription drugs, or dietary supplements you use. Also tell them if you smoke, drink alcohol, or use illegal  drugs. Some items may interact with your medicine.  What should I watch for while using this medicine?  Tell your doctor or health care professional if the pain lasts more than 10 days (5 days for children), if it gets worse, or if there is a new or different kind of pain. Also, check with your doctor if a fever lasts for more than 3 days.  Do not take other medicines that contain acetaminophen with this medicine. Always read labels carefully. If you have questions, ask your doctor or pharmacist.  If you take too much acetaminophen get medical help right away. Too much acetaminophen can be very dangerous and cause liver damage. Even if you do not have symptoms, it is important to get help right away.  What side effects may I notice from receiving this medicine?  Side effects that you should report to your doctor or health care professional as soon as possible:  -allergic reactions like skin rash, itching or hives, swelling of the face, lips, or tongue  -breathing problems  -fever or sore throat  -redness, blistering, peeling or loosening of the skin, including inside the mouth  -trouble passing urine or change in the amount of urine  -unusual bleeding or bruising  -unusually weak or tired  -yellowing of the eyes or skin  Side effects that usually do not require medical attention (report to your doctor or health care professional if they continue or are bothersome):  -headache  -nausea, stomach upset  This list may not describe all possible side effects. Call your doctor for medical advice about side effects. You may report side effects to FDA at 7-363-FDA-1991.  Where should I keep my medicine?  Keep out of reach of children.  Store at room temperature between 20 and 25 degrees C (68 and 77 degrees F). Protect from moisture and heat. Throw away any unused medicine after the expiration date.  NOTE: This sheet is a summary. It may not cover all possible information. If you have questions about this medicine, talk to  your doctor, pharmacist, or health care provider.  © 2019 [employer]/Gold Standard (2014-08-11 12:54:16)  Diphenhydramine capsules or tablets  What is this medicine?  DIPHENHYDRAMINE (dye karen KAI rios) is an antihistamine. It is used to treat the symptoms of an allergic reaction. It is also used to treat Parkinson's disease. This medicine is also used to prevent and to treat motion sickness and as a nighttime sleep aid.  This medicine may be used for other purposes; ask your health care provider or pharmacist if you have questions.  COMMON BRAND NAME(S): Dania-York Plus Allergy, Aller-G-Time, Banophen, Benadryl Allergy, Benadryl Allergy Dye Free, Benadryl Allergy Kapgel, Benadryl Allergy Ultratab, Diphedryl, Diphenhist, Genahist, Ayanna-Dryl, PHARBEDRYL, Q-Dryl, Sleepinal, Valu-Dryl, Vicks ZzzQuil Nightime Sleep-Aid  What should I tell my health care provider before I take this medicine?  They need to know if you have any of these conditions:  -asthma or lung disease  -glaucoma  -high blood pressure or heart disease  -liver disease  -pain or difficulty passing urine  -prostate trouble  -ulcers or other stomach problems  -an unusual or allergic reaction to diphenhydramine, other medicines foods, dyes, or preservatives such as sulfites  -pregnant or trying to get pregnant  -breast-feeding  How should I use this medicine?  Take this medicine by mouth with a full glass of water. Follow the directions on the prescription label. Take your doses at regular intervals. Do not take your medicine more often than directed. To prevent motion sickness start taking this medicine 30 to 60 minutes before you leave.  Talk to your pediatrician regarding the use of this medicine in children. Special care may be needed.  Patients over 65 years old may have a stronger reaction and need a smaller dose.  Overdosage: If you think you have taken too much of this medicine contact a poison control center or emergency room at once.  NOTE:  This medicine is only for you. Do not share this medicine with others.  What if I miss a dose?  If you miss a dose, take it as soon as you can. If it is almost time for your next dose, take only that dose. Do not take double or extra doses.  What may interact with this medicine?  Do not take this medicine with any of the following medications:  -MAOIs like Carbex, Eldepryl, Marplan, Nardil, and Parnate  This medicine may also interact with the following medications:  -alcohol  -barbiturates, like phenobarbital  -medicines for bladder spasm like oxybutynin, tolterodine  -medicines for blood pressure  -medicines for depression, anxiety, or psychotic disturbances  -medicines for movement abnormalities or Parkinson's disease  -medicines for sleep  -other medicines for cold, cough or allergy  -some medicines for the stomach like chlordiazepoxide, dicyclomine  This list may not describe all possible interactions. Give your health care provider a list of all the medicines, herbs, non-prescription drugs, or dietary supplements you use. Also tell them if you smoke, drink alcohol, or use illegal drugs. Some items may interact with your medicine.  What should I watch for while using this medicine?  Visit your doctor or health care professional for regular check ups. Tell your doctor if your symptoms do not improve or if they get worse.  Your mouth may get dry. Chewing sugarless gum or sucking hard candy, and drinking plenty of water may help. Contact your doctor if the problem does not go away or is severe.  This medicine may cause dry eyes and blurred vision. If you wear contact lenses you may feel some discomfort. Lubricating drops may help. See your eye doctor if the problem does not go away or is severe.  You may get drowsy or dizzy. Do not drive, use machinery, or do anything that needs mental alertness until you know how this medicine affects you. Do not stand or sit up quickly, especially if you are an older patient.  This reduces the risk of dizzy or fainting spells. Alcohol may interfere with the effect of this medicine. Avoid alcoholic drinks.  What side effects may I notice from receiving this medicine?  Side effects that you should report to your doctor or health care professional as soon as possible:  -allergic reactions like skin rash, itching or hives, swelling of the face, lips, or tongue  -changes in vision  -confused, agitated, nervous  -irregular or fast heartbeat  -tremor  -trouble passing urine  -unusual bleeding or bruising  -unusually weak or tired  Side effects that usually do not require medical attention (report to your doctor or health care professional if they continue or are bothersome):  -constipation, diarrhea  -drowsy  -headache  -loss of appetite  -stomach upset, vomiting  -thick mucous  This list may not describe all possible side effects. Call your doctor for medical advice about side effects. You may report side effects to FDA at 3-759-FDA-6332.  Where should I keep my medicine?  Keep out of the reach of children.  Store at room temperature between 15 and 30 degrees C (59 and 86 degrees F). Keep container closed tightly. Throw away any unused medicine after the expiration date.  NOTE: This sheet is a summary. It may not cover all possible information. If you have questions about this medicine, talk to your doctor, pharmacist, or health care provider.  © 2019 [employer] Standard (2009-04-06 17:06:22)

## 2019-08-23 ENCOUNTER — HOSPITAL ENCOUNTER (OUTPATIENT)
Dept: INFUSION THERAPY | Facility: HOSPITAL | Age: 64
Setting detail: INFUSION SERIES
Discharge: HOME | End: 2019-08-23

## 2019-08-23 VITALS
TEMPERATURE: 97.7 F | DIASTOLIC BLOOD PRESSURE: 72 MMHG | RESPIRATION RATE: 17 BRPM | SYSTOLIC BLOOD PRESSURE: 114 MMHG | HEART RATE: 63 BPM

## 2019-08-23 DIAGNOSIS — M06.09 RHEUMATOID ARTHRITIS OF MULTIPLE SITES WITHOUT RHEUMATOID FACTOR (HCC): Primary | ICD-10-CM

## 2019-08-23 PROCEDURE — 63710000001 DIPHENHYDRAMINE PER 50 MG: Performed by: INTERNAL MEDICINE

## 2019-08-23 PROCEDURE — 96415 CHEMO IV INFUSION ADDL HR: CPT

## 2019-08-23 PROCEDURE — 96413 CHEMO IV INFUSION 1 HR: CPT

## 2019-08-23 PROCEDURE — 25010000002 INFLIXIMAB PER 10 MG: Performed by: INTERNAL MEDICINE

## 2019-08-23 RX ORDER — DIPHENHYDRAMINE HCL 25 MG
25 CAPSULE ORAL ONCE
Status: CANCELLED
Start: 2019-08-23 | End: 2019-08-23

## 2019-08-23 RX ORDER — ACETAMINOPHEN 325 MG/1
1000 TABLET ORAL ONCE
Status: COMPLETED | OUTPATIENT
Start: 2019-08-23 | End: 2019-08-23

## 2019-08-23 RX ORDER — ACETAMINOPHEN 500 MG
1000 TABLET ORAL ONCE
Status: CANCELLED
Start: 2019-08-23 | End: 2019-08-23

## 2019-08-23 RX ORDER — DIPHENHYDRAMINE HCL 25 MG
25 CAPSULE ORAL ONCE
Status: COMPLETED | OUTPATIENT
Start: 2019-08-23 | End: 2019-08-23

## 2019-08-23 RX ADMIN — ACETAMINOPHEN 975 MG: 325 TABLET ORAL at 10:21

## 2019-08-23 RX ADMIN — INFLIXIMAB 250 MG: 100 INJECTION, POWDER, LYOPHILIZED, FOR SOLUTION INTRAVENOUS at 10:29

## 2019-08-23 RX ADMIN — DIPHENHYDRAMINE HYDROCHLORIDE 25 MG: 25 CAPSULE ORAL at 10:21

## 2019-08-23 NOTE — PATIENT INSTRUCTIONS
Infliximab injection  What is this medicine?  INFLIXIMAB (in FLIX i mab) is used to treat Crohn's disease and ulcerative colitis. It is also used to treat ankylosing spondylitis, plaque psoriasis, and some forms of arthritis.  This medicine may be used for other purposes; ask your health care provider or pharmacist if you have questions.  COMMON BRAND NAME(S): INFLECTRA, Remicade, RENFLEXIS  What should I tell my health care provider before I take this medicine?  They need to know if you have any of these conditions:  -cancer  -current or past resident of [facility]  -diabetes  -exposure to tuberculosis  -Guillain-Eagar syndrome  -heart failure  -hepatitis or liver disease  -immune system problems  -infection  -lung or breathing disease, like COPD  -multiple sclerosis  -receiving phototherapy for the skin  -seizure disorder  -an unusual or allergic reaction to infliximab, mouse proteins, other medicines, foods, dyes, or preservatives  -pregnant or trying to get pregnant  -breast-feeding  How should I use this medicine?  This medicine is for injection into a vein. It is usually given by a health care professional in a hospital or clinic setting.  A special MedGuide will be given to you by the pharmacist with each prescription and refill. Be sure to read this information carefully each time.  Talk to your pediatrician regarding the use of this medicine in children. While this drug may be prescribed for children as young as 6 years of age for selected conditions, precautions do apply.  Overdosage: If you think you have taken too much of this medicine contact a poison control center or emergency room at once.  NOTE: This medicine is only for you. Do not share this medicine with others.  What if I miss a dose?  It is important not to miss your dose. Call your doctor or health care professional if you are unable to keep an appointment.  What may interact with this medicine?  Do not take this  medicine with any of the following medications:  -biologic medicines such as abatacept, adalimumab, anakinra, certolizumab, etanercept, golimumab, rituximab, secukinumab, tocilizumab, tofactinib, ustekinumab  -live vaccines  This list may not describe all possible interactions. Give your health care provider a list of all the medicines, herbs, non-prescription drugs, or dietary supplements you use. Also tell them if you smoke, drink alcohol, or use illegal drugs. Some items may interact with your medicine.  What should I watch for while using this medicine?  Your condition will be monitored carefully while you are receiving this medicine. Visit your doctor or health care professional for regular checks on your progress. You may need blood work done while you are taking this medicine. Before beginning therapy, your doctor may do a test to see if you have been exposed to tuberculosis.  Call your doctor or health care professional for advice if you get a fever, chills or sore throat, or other symptoms of a cold or flu. Do not treat yourself. This drug decreases your body's ability to fight infections. Try to avoid being around people who are sick.  This medicine may make the symptoms of heart failure worse in some patients. If you notice symptoms such as increased shortness of breath or swelling of the ankles or legs, contact your health care provider right away.  If you are going to have surgery or dental work, tell your health care professional or dentist that you have received this medicine.  If you take this medicine for plaque psoriasis, stay out of the sun. If you cannot avoid being in the sun, wear protective clothing and use sunscreen. Do not use sun lamps or tanning beds/booths.  Talk to your doctor about your risk of cancer. You may be more at risk for certain types of cancers if you take this medicine.  What side effects may I notice from receiving this medicine?  Side effects that you should report to your  doctor or health care professional as soon as possible:  -allergic reactions like skin rash, itching or hives, swelling of the face, lips, or tongue  -breathing problems  -changes in vision  -chest pain  -fever or chills, usually related to the infusion  -joint pain  -pain, tingling, numbness in the hands or feet  -redness, blistering, peeling or loosening of the skin, including inside the mouth  -seizures  -signs of infection - fever or chills, cough, sore throat, flu-like symptoms, pain or difficulty passing urine  -signs and symptoms of liver injury like dark yellow or brown urine; general ill feeling; light-colored stools; loss of appetite; nausea; right upper belly pain; unusually weak or tired; yellowing of the eyes or skin  -signs and symptoms of a stroke like changes in vision; confusion; trouble speaking or understanding; severe headaches; sudden numbness or weakness of the face, arm or leg; trouble walking; dizziness; loss of balance or coordination  -swelling of the ankles, feet, or hands  -swollen lymph nodes in the neck, underarm, or groin areas  -unusual bleeding or bruising  -unusually weak or tired  Side effects that usually do not require medical attention (report to your doctor or health care professional if they continue or are bothersome):  -headache  -nausea  -stomach pain  -upset stomach  This list may not describe all possible side effects. Call your doctor for medical advice about side effects. You may report side effects to FDA at 6-514-FDA-6937.  Where should I keep my medicine?  This drug is given in a hospital or clinic and will not be stored at home.  NOTE: This sheet is a summary. It may not cover all possible information. If you have questions about this medicine, talk to your doctor, pharmacist, or health care provider.  © 2019 Elsevier/Gold Standard (2018-01-17 13:45:32)  Acetaminophen tablets or caplets  What is this medicine?  ACETAMINOPHEN (a set a HUNTER herminio fen) is a pain reliever.  It is used to treat mild pain and fever.  This medicine may be used for other purposes; ask your health care provider or pharmacist if you have questions.  COMMON BRAND NAME(S): Aceta, Actamin, Anacin Aspirin Free, Genapap, Genebs, Mapap, Pain & Fever, Pain and Fever, PAIN RELIEF, PAIN RELIEF Extra Strength, Pain Reliever, Panadol, PHARBETOL, Q-Pap, Q-Pap Extra Strength, Tylenol, Tylenol CrushableTablet, Tylenol Extra Strength, XS No Aspirin, XS Pain Reliever  What should I tell my health care provider before I take this medicine?  They need to know if you have any of these conditions:  -if you often drink alcohol  -liver disease  -an unusual or allergic reaction to acetaminophen, other medicines, foods, dyes, or preservatives  -pregnant or trying to get pregnant  -breast-feeding  How should I use this medicine?  Take this medicine by mouth with a glass of water. Follow the directions on the package or prescription label. Take your medicine at regular intervals. Do not take your medicine more often than directed.  Talk to your pediatrician regarding the use of this medicine in children. While this drug may be prescribed for children as young as 6 years of age for selected conditions, precautions do apply.  Overdosage: If you think you have taken too much of this medicine contact a poison control center or emergency room at once.  NOTE: This medicine is only for you. Do not share this medicine with others.  What if I miss a dose?  If you miss a dose, take it as soon as you can. If it is almost time for your next dose, take only that dose. Do not take double or extra doses.  What may interact with this medicine?  -alcohol  -imatinib  -isoniazid  -other medicines with acetaminophen  This list may not describe all possible interactions. Give your health care provider a list of all the medicines, herbs, non-prescription drugs, or dietary supplements you use. Also tell them if you smoke, drink alcohol, or use illegal  drugs. Some items may interact with your medicine.  What should I watch for while using this medicine?  Tell your doctor or health care professional if the pain lasts more than 10 days (5 days for children), if it gets worse, or if there is a new or different kind of pain. Also, check with your doctor if a fever lasts for more than 3 days.  Do not take other medicines that contain acetaminophen with this medicine. Always read labels carefully. If you have questions, ask your doctor or pharmacist.  If you take too much acetaminophen get medical help right away. Too much acetaminophen can be very dangerous and cause liver damage. Even if you do not have symptoms, it is important to get help right away.  What side effects may I notice from receiving this medicine?  Side effects that you should report to your doctor or health care professional as soon as possible:  -allergic reactions like skin rash, itching or hives, swelling of the face, lips, or tongue  -breathing problems  -fever or sore throat  -redness, blistering, peeling or loosening of the skin, including inside the mouth  -trouble passing urine or change in the amount of urine  -unusual bleeding or bruising  -unusually weak or tired  -yellowing of the eyes or skin  Side effects that usually do not require medical attention (report to your doctor or health care professional if they continue or are bothersome):  -headache  -nausea, stomach upset  This list may not describe all possible side effects. Call your doctor for medical advice about side effects. You may report side effects to FDA at 9-846-FDA-7083.  Where should I keep my medicine?  Keep out of reach of children.  Store at room temperature between 20 and 25 degrees C (68 and 77 degrees F). Protect from moisture and heat. Throw away any unused medicine after the expiration date.  NOTE: This sheet is a summary. It may not cover all possible information. If you have questions about this medicine, talk to  your doctor, pharmacist, or health care provider.  © 2019 Elsevier/Gold Standard (2014-08-11 12:54:16)  Diphenhydramine capsules or tablets  What is this medicine?  DIPHENHYDRAMINE (dye karen KAI rios) is an antihistamine. It is used to treat the symptoms of an allergic reaction. It is also used to treat Parkinson's disease. This medicine is also used to prevent and to treat motion sickness and as a nighttime sleep aid.  This medicine may be used for other purposes; ask your health care provider or pharmacist if you have questions.  COMMON BRAND NAME(S): Dania-[place] Plus Allergy, Aller-G-Time, Banophen, Benadryl Allergy, Benadryl Allergy Dye Free, Benadryl Allergy Kapgel, Benadryl Allergy Ultratab, Diphedryl, Diphenhist, Genahist, Ayanna-Dryl, PHARBEDRYL, Q-Dryl, Sleepinal, Valu-Dryl, Vicks ZzzQuil Nightime Sleep-Aid  What should I tell my health care provider before I take this medicine?  They need to know if you have any of these conditions:  -asthma or lung disease  -glaucoma  -high blood pressure or heart disease  -liver disease  -pain or difficulty passing urine  -prostate trouble  -ulcers or other stomach problems  -an unusual or allergic reaction to diphenhydramine, other medicines foods, dyes, or preservatives such as sulfites  -pregnant or trying to get pregnant  -breast-feeding  How should I use this medicine?  Take this medicine by mouth with a full glass of water. Follow the directions on the prescription label. Take your doses at regular intervals. Do not take your medicine more often than directed. To prevent motion sickness start taking this medicine 30 to 60 minutes before you leave.  Talk to your pediatrician regarding the use of this medicine in children. Special care may be needed.  Patients over 65 years old may have a stronger reaction and need a smaller dose.  Overdosage: If you think you have taken too much of this medicine contact a poison control center or emergency room at once.  NOTE:  This medicine is only for you. Do not share this medicine with others.  What if I miss a dose?  If you miss a dose, take it as soon as you can. If it is almost time for your next dose, take only that dose. Do not take double or extra doses.  What may interact with this medicine?  Do not take this medicine with any of the following medications:  -MAOIs like Carbex, Eldepryl, Marplan, Nardil, and Parnate  This medicine may also interact with the following medications:  -alcohol  -barbiturates, like phenobarbital  -medicines for bladder spasm like oxybutynin, tolterodine  -medicines for blood pressure  -medicines for depression, anxiety, or psychotic disturbances  -medicines for movement abnormalities or Parkinson's disease  -medicines for sleep  -other medicines for cold, cough or allergy  -some medicines for the stomach like chlordiazepoxide, dicyclomine  This list may not describe all possible interactions. Give your health care provider a list of all the medicines, herbs, non-prescription drugs, or dietary supplements you use. Also tell them if you smoke, drink alcohol, or use illegal drugs. Some items may interact with your medicine.  What should I watch for while using this medicine?  Visit your doctor or health care professional for regular check ups. Tell your doctor if your symptoms do not improve or if they get worse.  Your mouth may get dry. Chewing sugarless gum or sucking hard candy, and drinking plenty of water may help. Contact your doctor if the problem does not go away or is severe.  This medicine may cause dry eyes and blurred vision. If you wear contact lenses you may feel some discomfort. Lubricating drops may help. See your eye doctor if the problem does not go away or is severe.  You may get drowsy or dizzy. Do not drive, use machinery, or do anything that needs mental alertness until you know how this medicine affects you. Do not stand or sit up quickly, especially if you are an older patient.  This reduces the risk of dizzy or fainting spells. Alcohol may interfere with the effect of this medicine. Avoid alcoholic drinks.  What side effects may I notice from receiving this medicine?  Side effects that you should report to your doctor or health care professional as soon as possible:  -allergic reactions like skin rash, itching or hives, swelling of the face, lips, or tongue  -changes in vision  -confused, agitated, nervous  -irregular or fast heartbeat  -tremor  -trouble passing urine  -unusual bleeding or bruising  -unusually weak or tired  Side effects that usually do not require medical attention (report to your doctor or health care professional if they continue or are bothersome):  -constipation, diarrhea  -drowsy  -headache  -loss of appetite  -stomach upset, vomiting  -thick mucous  This list may not describe all possible side effects. Call your doctor for medical advice about side effects. You may report side effects to FDA at 8-730-FDA-9820.  Where should I keep my medicine?  Keep out of the reach of children.  Store at room temperature between 15 and 30 degrees C (59 and 86 degrees F). Keep container closed tightly. Throw away any unused medicine after the expiration date.  NOTE: This sheet is a summary. It may not cover all possible information. If you have questions about this medicine, talk to your doctor, pharmacist, or health care provider.  © 2019 Elsevier/Gold Standard (2009-04-06 17:06:22)

## 2019-09-23 ENCOUNTER — HOSPITAL ENCOUNTER (OUTPATIENT)
Dept: INFUSION THERAPY | Facility: HOSPITAL | Age: 64
Setting detail: INFUSION SERIES
Discharge: HOME | End: 2019-09-23

## 2019-09-23 VITALS
TEMPERATURE: 98.2 F | SYSTOLIC BLOOD PRESSURE: 144 MMHG | DIASTOLIC BLOOD PRESSURE: 87 MMHG | HEART RATE: 71 BPM | RESPIRATION RATE: 18 BRPM

## 2019-09-23 DIAGNOSIS — M06.09 RHEUMATOID ARTHRITIS OF MULTIPLE SITES WITHOUT RHEUMATOID FACTOR (HCC): Primary | ICD-10-CM

## 2019-09-23 PROCEDURE — 25010000002 INFLIXIMAB PER 10 MG: Performed by: INTERNAL MEDICINE

## 2019-09-23 PROCEDURE — 96413 CHEMO IV INFUSION 1 HR: CPT

## 2019-09-23 PROCEDURE — 63710000001 DIPHENHYDRAMINE PER 50 MG: Performed by: INTERNAL MEDICINE

## 2019-09-23 PROCEDURE — 96415 CHEMO IV INFUSION ADDL HR: CPT

## 2019-09-23 RX ORDER — ACETAMINOPHEN 500 MG
1000 TABLET ORAL ONCE
Start: 2019-09-23 | End: 2019-09-23

## 2019-09-23 RX ORDER — DIPHENHYDRAMINE HCL 25 MG
25 CAPSULE ORAL ONCE
Status: COMPLETED | OUTPATIENT
Start: 2019-09-23 | End: 2019-09-23

## 2019-09-23 RX ORDER — DIPHENHYDRAMINE HCL 25 MG
25 CAPSULE ORAL ONCE
Start: 2019-09-23 | End: 2019-09-23

## 2019-09-23 RX ORDER — ACETAMINOPHEN 325 MG/1
975 TABLET ORAL ONCE
Status: COMPLETED | OUTPATIENT
Start: 2019-09-23 | End: 2019-09-23

## 2019-09-23 RX ADMIN — ACETAMINOPHEN 975 MG: 325 TABLET ORAL at 10:23

## 2019-09-23 RX ADMIN — INFLIXIMAB 250 MG: 100 INJECTION, POWDER, LYOPHILIZED, FOR SOLUTION INTRAVENOUS at 10:25

## 2019-09-23 RX ADMIN — DIPHENHYDRAMINE HYDROCHLORIDE 25 MG: 25 CAPSULE ORAL at 10:23

## 2019-11-20 ENCOUNTER — APPOINTMENT (OUTPATIENT)
Dept: INFUSION THERAPY | Facility: HOSPITAL | Age: 64
End: 2019-11-20

## 2019-12-31 ENCOUNTER — HOSPITAL ENCOUNTER (OUTPATIENT)
Dept: MAMMOGRAPHY | Facility: HOSPITAL | Age: 64
Discharge: HOME | End: 2019-12-31
Admitting: INTERNAL MEDICINE

## 2019-12-31 DIAGNOSIS — Z12.31 VISIT FOR SCREENING MAMMOGRAM: ICD-10-CM

## 2019-12-31 PROCEDURE — 77067 SCR MAMMO BI INCL CAD: CPT

## 2019-12-31 PROCEDURE — 77063 BREAST TOMOSYNTHESIS BI: CPT | Performed by: RADIOLOGY

## 2019-12-31 PROCEDURE — 77067 SCR MAMMO BI INCL CAD: CPT | Performed by: RADIOLOGY

## 2019-12-31 PROCEDURE — 77063 BREAST TOMOSYNTHESIS BI: CPT

## 2020-09-23 ENCOUNTER — TRANSCRIBE ORDERS (OUTPATIENT)
Dept: LAB | Facility: HOSPITAL | Age: 65
End: 2020-09-23

## 2020-09-23 ENCOUNTER — LAB (OUTPATIENT)
Dept: LAB | Facility: HOSPITAL | Age: 65
End: 2020-09-23

## 2020-09-23 DIAGNOSIS — M47.816 LUMBAR SPONDYLOSIS: ICD-10-CM

## 2020-09-23 DIAGNOSIS — M75.21 BICIPITAL TENDINITIS OF RIGHT SHOULDER: ICD-10-CM

## 2020-09-23 DIAGNOSIS — M79.7 SCAPULOHUMERAL FIBROSITIS: ICD-10-CM

## 2020-09-23 DIAGNOSIS — M06.09 RHEUMATOID ARTHRITIS OF MULTIPLE SITES WITHOUT RHEUMATOID FACTOR (HCC): ICD-10-CM

## 2020-09-23 DIAGNOSIS — M06.09 RHEUMATOID ARTHRITIS OF MULTIPLE SITES WITHOUT RHEUMATOID FACTOR (HCC): Primary | ICD-10-CM

## 2020-09-23 DIAGNOSIS — M15.0 PRIMARY GENERALIZED HYPERTROPHIC OSTEOARTHROSIS: ICD-10-CM

## 2020-09-23 LAB
ALBUMIN SERPL-MCNC: 4 G/DL (ref 3.5–5.2)
ALBUMIN/GLOB SERPL: 1.7 G/DL
ALP SERPL-CCNC: 86 U/L (ref 39–117)
ALT SERPL W P-5'-P-CCNC: 124 U/L (ref 1–33)
ANION GAP SERPL CALCULATED.3IONS-SCNC: 9.6 MMOL/L (ref 5–15)
AST SERPL-CCNC: 106 U/L (ref 1–32)
BASOPHILS # BLD AUTO: 0.05 10*3/MM3 (ref 0–0.2)
BASOPHILS NFR BLD AUTO: 1.1 % (ref 0–1.5)
BILIRUB SERPL-MCNC: 0.2 MG/DL (ref 0–1.2)
BUN SERPL-MCNC: 10 MG/DL (ref 8–23)
BUN/CREAT SERPL: 11.1 (ref 7–25)
CALCIUM SPEC-SCNC: 8.9 MG/DL (ref 8.6–10.5)
CHLORIDE SERPL-SCNC: 104 MMOL/L (ref 98–107)
CO2 SERPL-SCNC: 24.4 MMOL/L (ref 22–29)
CREAT SERPL-MCNC: 0.9 MG/DL (ref 0.57–1)
CRP SERPL-MCNC: 0.16 MG/DL (ref 0–0.5)
DEPRECATED RDW RBC AUTO: 59.6 FL (ref 37–54)
EOSINOPHIL # BLD AUTO: 0.12 10*3/MM3 (ref 0–0.4)
EOSINOPHIL NFR BLD AUTO: 2.5 % (ref 0.3–6.2)
ERYTHROCYTE [DISTWIDTH] IN BLOOD BY AUTOMATED COUNT: 18.6 % (ref 12.3–15.4)
ERYTHROCYTE [SEDIMENTATION RATE] IN BLOOD: 17 MM/HR (ref 0–30)
GFR SERPL CREATININE-BSD FRML MDRD: 63 ML/MIN/1.73
GLOBULIN UR ELPH-MCNC: 2.3 GM/DL
GLUCOSE SERPL-MCNC: 80 MG/DL (ref 65–99)
HCT VFR BLD AUTO: 32.6 % (ref 34–46.6)
HGB BLD-MCNC: 10.6 G/DL (ref 12–15.9)
IMM GRANULOCYTES # BLD AUTO: 0.02 10*3/MM3 (ref 0–0.05)
IMM GRANULOCYTES NFR BLD AUTO: 0.4 % (ref 0–0.5)
LYMPHOCYTES # BLD AUTO: 2.4 10*3/MM3 (ref 0.7–3.1)
LYMPHOCYTES NFR BLD AUTO: 51 % (ref 19.6–45.3)
MCH RBC QN AUTO: 29.4 PG (ref 26.6–33)
MCHC RBC AUTO-ENTMCNC: 32.5 G/DL (ref 31.5–35.7)
MCV RBC AUTO: 90.3 FL (ref 79–97)
MONOCYTES # BLD AUTO: 0.25 10*3/MM3 (ref 0.1–0.9)
MONOCYTES NFR BLD AUTO: 5.3 % (ref 5–12)
NEUTROPHILS NFR BLD AUTO: 1.87 10*3/MM3 (ref 1.7–7)
NEUTROPHILS NFR BLD AUTO: 39.7 % (ref 42.7–76)
NRBC BLD AUTO-RTO: 0.2 /100 WBC (ref 0–0.2)
PLATELET # BLD AUTO: 606 10*3/MM3 (ref 140–450)
PMV BLD AUTO: 9.8 FL (ref 6–12)
POTASSIUM SERPL-SCNC: 4.6 MMOL/L (ref 3.5–5.2)
PROT SERPL-MCNC: 6.3 G/DL (ref 6–8.5)
RBC # BLD AUTO: 3.61 10*6/MM3 (ref 3.77–5.28)
SODIUM SERPL-SCNC: 138 MMOL/L (ref 136–145)
WBC # BLD AUTO: 4.71 10*3/MM3 (ref 3.4–10.8)

## 2020-09-23 PROCEDURE — 86140 C-REACTIVE PROTEIN: CPT

## 2020-09-23 PROCEDURE — 80053 COMPREHEN METABOLIC PANEL: CPT

## 2020-09-23 PROCEDURE — 36415 COLL VENOUS BLD VENIPUNCTURE: CPT

## 2020-09-23 PROCEDURE — 85652 RBC SED RATE AUTOMATED: CPT

## 2020-09-23 PROCEDURE — 85025 COMPLETE CBC W/AUTO DIFF WBC: CPT

## 2020-12-04 ENCOUNTER — HOSPITAL ENCOUNTER (EMERGENCY)
Facility: HOSPITAL | Age: 65
Discharge: HOME | End: 2020-12-04
Attending: EMERGENCY MEDICINE | Admitting: EMERGENCY MEDICINE

## 2020-12-04 VITALS
SYSTOLIC BLOOD PRESSURE: 164 MMHG | DIASTOLIC BLOOD PRESSURE: 90 MMHG | HEART RATE: 74 BPM | WEIGHT: 174 LBS | RESPIRATION RATE: 20 BRPM | OXYGEN SATURATION: 98 % | HEIGHT: 66 IN | BODY MASS INDEX: 27.97 KG/M2 | TEMPERATURE: 98.5 F

## 2020-12-04 DIAGNOSIS — H61.91 EARLOBE LESION, RIGHT: Primary | ICD-10-CM

## 2020-12-04 PROCEDURE — 99283 EMERGENCY DEPT VISIT LOW MDM: CPT

## 2020-12-04 RX ORDER — AMOXICILLIN AND CLAVULANATE POTASSIUM 875; 125 MG/1; MG/1
1 TABLET, FILM COATED ORAL 2 TIMES DAILY
Qty: 20 TABLET | Refills: 0 | Status: ON HOLD | OUTPATIENT
Start: 2020-12-04 | End: 2022-07-26

## 2020-12-04 RX ADMIN — MUPIROCIN 1 APPLICATION: 20 OINTMENT TOPICAL at 13:40

## 2020-12-04 NOTE — ED PROVIDER NOTES
Subjective   65 year old female presents to the ED with right sided ear pain. Pt states about 10 days ago she noticed a black spot in her ear, which looked like a blackhead therefore she scratched it and tore the top layer of her skin off. She states it isn't healing and hurts. Denies fever, drainage, or any other complaints at this time. Denies any alleviating factors.      History provided by:  Patient   used: No        Review of Systems   Constitutional: Negative.  Negative for fever.   HENT: Positive for ear pain.    Respiratory: Negative.    Cardiovascular: Negative.  Negative for chest pain.   Gastrointestinal: Negative.  Negative for abdominal pain.   Endocrine: Negative.    Genitourinary: Negative.  Negative for dysuria.   Skin: Negative.    Neurological: Negative.    Psychiatric/Behavioral: Negative.    All other systems reviewed and are negative.      Past Medical History:   Diagnosis Date   • DDD (degenerative disc disease), cervical    • Deviated septum    • Disease of thyroid gland    • Hiatal hernia    • Hyperlipidemia    • Injury of back    • Neuropathy    • PTSD (post-traumatic stress disorder)    • Rheumatoid aortitis        Allergies   Allergen Reactions   • Sulfa Antibiotics    • Ultram [Tramadol Hcl]        Past Surgical History:   Procedure Laterality Date   • BACK SURGERY     • BLADDER SURGERY     • FRACTURE SURGERY      RIGHT ANKLE   • HERNIA REPAIR     • HYSTERECTOMY     • NECK SURGERY     • SEPTOPLASTY, RESECTION INFERIOR TURBINATES Bilateral 10/24/2018    Procedure: SEPTOPLASTY,  INFERIOR TURBINATE lateralization  WITH LATERA IMPLANTS;  Surgeon: Josh Foy MD;  Location: [facility];  Service: ENT       Family History   Problem Relation Age of Onset   • Breast cancer Mother    • Breast cancer Sister        Social History     Socioeconomic History   • Marital status: Single     Spouse name: Not on file   • Number of children: Not on file   • Years of education: Not  on file   • Highest education level: Not on file   Tobacco Use   • Smoking status: Never Smoker   • Smokeless tobacco: Never Used   Substance and Sexual Activity   • Alcohol use: No   • Drug use: No   • Sexual activity: Defer           Objective   Physical Exam  Vitals signs and nursing note reviewed.   Constitutional:       General: She is not in acute distress.     Appearance: She is well-developed. She is not diaphoretic.   HENT:      Head: Normocephalic and atraumatic.      Right Ear: Hearing, tympanic membrane and ear canal normal. Tenderness present.      Left Ear: Hearing, tympanic membrane, ear canal and external ear normal.      Ears:        Nose: Nose normal.   Eyes:      Conjunctiva/sclera: Conjunctivae normal.      Pupils: Pupils are equal, round, and reactive to light.   Neck:      Musculoskeletal: Normal range of motion and neck supple.      Vascular: No JVD.      Trachea: No tracheal deviation.   Cardiovascular:      Rate and Rhythm: Normal rate and regular rhythm.      Heart sounds: Normal heart sounds. No murmur.   Pulmonary:      Effort: Pulmonary effort is normal. No respiratory distress.      Breath sounds: Normal breath sounds. No wheezing.   Abdominal:      General: Bowel sounds are normal.      Palpations: Abdomen is soft.      Tenderness: There is no abdominal tenderness.   Musculoskeletal: Normal range of motion.         General: No deformity.   Skin:     General: Skin is warm and dry.      Coloration: Skin is not pale.      Findings: No erythema or rash.   Neurological:      Mental Status: She is alert and oriented to person, place, and time.      Cranial Nerves: No cranial nerve deficit.   Psychiatric:         Behavior: Behavior normal.         Thought Content: Thought content normal.         Procedures           ED Course                                           MDM  Number of Diagnoses or Management Options  Earlobe lesion, right: new and does not require workup  Risk of Complications,  Morbidity, and/or Mortality  Presenting problems: low  Diagnostic procedures: minimal  Management options: moderate    Patient Progress  Patient progress: stable      Final diagnoses:   Earlobe lesion, right            Erickson Jay PA-C  12/04/20 1502

## 2021-01-11 ENCOUNTER — HOSPITAL ENCOUNTER (OUTPATIENT)
Dept: MAMMOGRAPHY | Facility: HOSPITAL | Age: 66
Discharge: HOME | End: 2021-01-11
Admitting: INTERNAL MEDICINE

## 2021-01-11 DIAGNOSIS — Z12.31 VISIT FOR SCREENING MAMMOGRAM: ICD-10-CM

## 2021-01-11 PROCEDURE — 77067 SCR MAMMO BI INCL CAD: CPT

## 2021-01-11 PROCEDURE — 77063 BREAST TOMOSYNTHESIS BI: CPT

## 2021-01-11 PROCEDURE — 77063 BREAST TOMOSYNTHESIS BI: CPT | Performed by: RADIOLOGY

## 2021-01-11 PROCEDURE — 77067 SCR MAMMO BI INCL CAD: CPT | Performed by: RADIOLOGY

## 2021-02-25 DIAGNOSIS — Z23 IMMUNIZATION DUE: ICD-10-CM

## 2021-05-10 ENCOUNTER — TRANSCRIBE ORDERS (OUTPATIENT)
Dept: ADMINISTRATIVE | Facility: HOSPITAL | Age: 66
End: 2021-05-10

## 2021-05-10 DIAGNOSIS — M54.16 LUMBAR RADICULOPATHY: Primary | ICD-10-CM

## 2021-05-21 ENCOUNTER — HOSPITAL ENCOUNTER (OUTPATIENT)
Dept: MRI IMAGING | Facility: HOSPITAL | Age: 66
Discharge: HOME | End: 2021-05-21
Admitting: PHYSICIAN ASSISTANT

## 2021-05-21 DIAGNOSIS — M54.16 LUMBAR RADICULOPATHY: ICD-10-CM

## 2021-05-21 PROCEDURE — 72148 MRI LUMBAR SPINE W/O DYE: CPT

## 2021-05-21 PROCEDURE — 72148 MRI LUMBAR SPINE W/O DYE: CPT | Performed by: RADIOLOGY

## 2021-07-20 ENCOUNTER — HOSPITAL ENCOUNTER (OUTPATIENT)
Dept: GENERAL RADIOLOGY | Facility: HOSPITAL | Age: 66
Discharge: HOME | End: 2021-07-20
Admitting: PHYSICIAN ASSISTANT

## 2021-07-20 ENCOUNTER — TRANSCRIBE ORDERS (OUTPATIENT)
Dept: ADMINISTRATIVE | Facility: HOSPITAL | Age: 66
End: 2021-07-20

## 2021-07-20 DIAGNOSIS — M25.572 ARTHRALGIA OF LEFT ANKLE: ICD-10-CM

## 2021-07-20 DIAGNOSIS — M25.572 ARTHRALGIA OF LEFT ANKLE: Primary | ICD-10-CM

## 2021-07-20 PROCEDURE — 73610 X-RAY EXAM OF ANKLE: CPT | Performed by: RADIOLOGY

## 2021-07-20 PROCEDURE — 73610 X-RAY EXAM OF ANKLE: CPT

## 2022-07-25 ENCOUNTER — HOSPITAL ENCOUNTER (INPATIENT)
Facility: HOSPITAL | Age: 67
LOS: 8 days | Discharge: HOME | End: 2022-08-02
Attending: PSYCHIATRY & NEUROLOGY | Admitting: PSYCHIATRY & NEUROLOGY

## 2022-07-25 ENCOUNTER — HOSPITAL ENCOUNTER (EMERGENCY)
Facility: HOSPITAL | Age: 67
Discharge: PSYCHIATRIC OR SUBSTANCE USE FACILITY | End: 2022-07-25
Attending: EMERGENCY MEDICINE | Admitting: EMERGENCY MEDICINE

## 2022-07-25 ENCOUNTER — APPOINTMENT (OUTPATIENT)
Dept: CT IMAGING | Facility: HOSPITAL | Age: 67
End: 2022-07-25

## 2022-07-25 VITALS
OXYGEN SATURATION: 100 % | HEIGHT: 66 IN | TEMPERATURE: 97.2 F | WEIGHT: 165 LBS | HEART RATE: 91 BPM | RESPIRATION RATE: 17 BRPM | BODY MASS INDEX: 26.52 KG/M2 | SYSTOLIC BLOOD PRESSURE: 159 MMHG | DIASTOLIC BLOOD PRESSURE: 77 MMHG

## 2022-07-25 DIAGNOSIS — F32.A DEPRESSION WITH SUICIDAL IDEATION: Primary | ICD-10-CM

## 2022-07-25 DIAGNOSIS — R45.851 DEPRESSION WITH SUICIDAL IDEATION: Primary | ICD-10-CM

## 2022-07-25 PROBLEM — F32.9 MDD (MAJOR DEPRESSIVE DISORDER): Status: ACTIVE | Noted: 2022-07-25

## 2022-07-25 LAB
ALBUMIN SERPL-MCNC: 4.92 G/DL (ref 3.5–5.2)
ALBUMIN/GLOB SERPL: 2 G/DL
ALP SERPL-CCNC: 100 U/L (ref 39–117)
ALT SERPL W P-5'-P-CCNC: 12 U/L (ref 1–33)
AMPHET+METHAMPHET UR QL: NEGATIVE
AMPHETAMINES UR QL: NEGATIVE
ANION GAP SERPL CALCULATED.3IONS-SCNC: 15.1 MMOL/L (ref 5–15)
AST SERPL-CCNC: 20 U/L (ref 1–32)
BACTERIA UR QL AUTO: ABNORMAL /HPF
BARBITURATES UR QL SCN: NEGATIVE
BASOPHILS # BLD AUTO: 0.08 10*3/MM3 (ref 0–0.2)
BASOPHILS NFR BLD AUTO: 1.5 % (ref 0–1.5)
BENZODIAZ UR QL SCN: NEGATIVE
BILIRUB SERPL-MCNC: 0.2 MG/DL (ref 0–1.2)
BILIRUB UR QL STRIP: NEGATIVE
BUN SERPL-MCNC: 5 MG/DL (ref 8–23)
BUN/CREAT SERPL: 7 (ref 7–25)
BUPRENORPHINE SERPL-MCNC: NEGATIVE NG/ML
CALCIUM SPEC-SCNC: 10.4 MG/DL (ref 8.6–10.5)
CANNABINOIDS SERPL QL: NEGATIVE
CHLORIDE SERPL-SCNC: 98 MMOL/L (ref 98–107)
CLARITY UR: CLEAR
CO2 SERPL-SCNC: 19.9 MMOL/L (ref 22–29)
COCAINE UR QL: NEGATIVE
COLOR UR: YELLOW
CREAT SERPL-MCNC: 0.71 MG/DL (ref 0.57–1)
DEPRECATED RDW RBC AUTO: 44.8 FL (ref 37–54)
EGFRCR SERPLBLD CKD-EPI 2021: 94.5 ML/MIN/1.73
EOSINOPHIL # BLD AUTO: 0.13 10*3/MM3 (ref 0–0.4)
EOSINOPHIL NFR BLD AUTO: 2.4 % (ref 0.3–6.2)
ERYTHROCYTE [DISTWIDTH] IN BLOOD BY AUTOMATED COUNT: 14 % (ref 12.3–15.4)
ETHANOL BLD-MCNC: <10 MG/DL (ref 0–10)
ETHANOL UR QL: <0.01 %
FLUAV RNA RESP QL NAA+PROBE: NOT DETECTED
FLUBV RNA RESP QL NAA+PROBE: NOT DETECTED
GLOBULIN UR ELPH-MCNC: 2.5 GM/DL
GLUCOSE SERPL-MCNC: 111 MG/DL (ref 65–99)
GLUCOSE UR STRIP-MCNC: NEGATIVE MG/DL
HCT VFR BLD AUTO: 35.8 % (ref 34–46.6)
HGB BLD-MCNC: 11.7 G/DL (ref 12–15.9)
HGB UR QL STRIP.AUTO: NEGATIVE
HOLD SPECIMEN: NORMAL
HOLD SPECIMEN: NORMAL
HYALINE CASTS UR QL AUTO: ABNORMAL /LPF
IMM GRANULOCYTES # BLD AUTO: 0.01 10*3/MM3 (ref 0–0.05)
IMM GRANULOCYTES NFR BLD AUTO: 0.2 % (ref 0–0.5)
KETONES UR QL STRIP: NEGATIVE
LEUKOCYTE ESTERASE UR QL STRIP.AUTO: ABNORMAL
LYMPHOCYTES # BLD AUTO: 1.94 10*3/MM3 (ref 0.7–3.1)
LYMPHOCYTES NFR BLD AUTO: 36.5 % (ref 19.6–45.3)
MAGNESIUM SERPL-MCNC: 1.9 MG/DL (ref 1.6–2.4)
MCH RBC QN AUTO: 28.5 PG (ref 26.6–33)
MCHC RBC AUTO-ENTMCNC: 32.7 G/DL (ref 31.5–35.7)
MCV RBC AUTO: 87.3 FL (ref 79–97)
METHADONE UR QL SCN: NEGATIVE
MONOCYTES # BLD AUTO: 0.42 10*3/MM3 (ref 0.1–0.9)
MONOCYTES NFR BLD AUTO: 7.9 % (ref 5–12)
NEUTROPHILS NFR BLD AUTO: 2.73 10*3/MM3 (ref 1.7–7)
NEUTROPHILS NFR BLD AUTO: 51.5 % (ref 42.7–76)
NITRITE UR QL STRIP: NEGATIVE
NRBC BLD AUTO-RTO: 0 /100 WBC (ref 0–0.2)
OPIATES UR QL: NEGATIVE
OXYCODONE UR QL SCN: NEGATIVE
PCP UR QL SCN: NEGATIVE
PH UR STRIP.AUTO: 7 [PH] (ref 5–8)
PLATELET # BLD AUTO: 535 10*3/MM3 (ref 140–450)
PMV BLD AUTO: 9.2 FL (ref 6–12)
POTASSIUM SERPL-SCNC: 4.1 MMOL/L (ref 3.5–5.2)
PROPOXYPH UR QL: NEGATIVE
PROT SERPL-MCNC: 7.4 G/DL (ref 6–8.5)
PROT UR QL STRIP: NEGATIVE
RBC # BLD AUTO: 4.1 10*6/MM3 (ref 3.77–5.28)
RBC # UR STRIP: ABNORMAL /HPF
REF LAB TEST METHOD: ABNORMAL
SARS-COV-2 RNA RESP QL NAA+PROBE: NOT DETECTED
SODIUM SERPL-SCNC: 133 MMOL/L (ref 136–145)
SP GR UR STRIP: 1.01 (ref 1–1.03)
SQUAMOUS #/AREA URNS HPF: ABNORMAL /HPF
TRICYCLICS UR QL SCN: NEGATIVE
UROBILINOGEN UR QL STRIP: ABNORMAL
WBC # UR STRIP: ABNORMAL /HPF
WBC NRBC COR # BLD: 5.31 10*3/MM3 (ref 3.4–10.8)
WHOLE BLOOD HOLD COAG: NORMAL
WHOLE BLOOD HOLD SPECIMEN: NORMAL

## 2022-07-25 PROCEDURE — 87636 SARSCOV2 & INF A&B AMP PRB: CPT | Performed by: PHYSICIAN ASSISTANT

## 2022-07-25 PROCEDURE — 70450 CT HEAD/BRAIN W/O DYE: CPT

## 2022-07-25 PROCEDURE — 82077 ASSAY SPEC XCP UR&BREATH IA: CPT | Performed by: PHYSICIAN ASSISTANT

## 2022-07-25 PROCEDURE — 87186 SC STD MICRODIL/AGAR DIL: CPT | Performed by: PHYSICIAN ASSISTANT

## 2022-07-25 PROCEDURE — 81001 URINALYSIS AUTO W/SCOPE: CPT | Performed by: PHYSICIAN ASSISTANT

## 2022-07-25 PROCEDURE — 93005 ELECTROCARDIOGRAM TRACING: CPT | Performed by: PSYCHIATRY & NEUROLOGY

## 2022-07-25 PROCEDURE — 99284 EMERGENCY DEPT VISIT MOD MDM: CPT

## 2022-07-25 PROCEDURE — 36415 COLL VENOUS BLD VENIPUNCTURE: CPT

## 2022-07-25 PROCEDURE — 80053 COMPREHEN METABOLIC PANEL: CPT | Performed by: PHYSICIAN ASSISTANT

## 2022-07-25 PROCEDURE — 80306 DRUG TEST PRSMV INSTRMNT: CPT | Performed by: PHYSICIAN ASSISTANT

## 2022-07-25 PROCEDURE — 83735 ASSAY OF MAGNESIUM: CPT | Performed by: PHYSICIAN ASSISTANT

## 2022-07-25 PROCEDURE — 85025 COMPLETE CBC W/AUTO DIFF WBC: CPT | Performed by: PHYSICIAN ASSISTANT

## 2022-07-25 PROCEDURE — 87086 URINE CULTURE/COLONY COUNT: CPT | Performed by: PHYSICIAN ASSISTANT

## 2022-07-25 RX ORDER — TRAZODONE HYDROCHLORIDE 50 MG/1
50 TABLET ORAL NIGHTLY PRN
Status: DISCONTINUED | OUTPATIENT
Start: 2022-07-25 | End: 2022-07-26

## 2022-07-25 RX ORDER — BENZTROPINE MESYLATE 1 MG/ML
1 INJECTION INTRAMUSCULAR; INTRAVENOUS ONCE AS NEEDED
Status: DISCONTINUED | OUTPATIENT
Start: 2022-07-25 | End: 2022-08-02 | Stop reason: HOSPADM

## 2022-07-25 RX ORDER — FAMOTIDINE 20 MG/1
20 TABLET, FILM COATED ORAL 2 TIMES DAILY PRN
Status: DISCONTINUED | OUTPATIENT
Start: 2022-07-25 | End: 2022-08-02 | Stop reason: HOSPADM

## 2022-07-25 RX ORDER — LOPERAMIDE HYDROCHLORIDE 2 MG/1
2 CAPSULE ORAL
Status: DISCONTINUED | OUTPATIENT
Start: 2022-07-25 | End: 2022-08-02 | Stop reason: HOSPADM

## 2022-07-25 RX ORDER — BENZONATATE 100 MG/1
100 CAPSULE ORAL 3 TIMES DAILY PRN
Status: DISCONTINUED | OUTPATIENT
Start: 2022-07-25 | End: 2022-08-02 | Stop reason: HOSPADM

## 2022-07-25 RX ORDER — ACETAMINOPHEN 325 MG/1
650 TABLET ORAL EVERY 6 HOURS PRN
Status: DISCONTINUED | OUTPATIENT
Start: 2022-07-25 | End: 2022-08-02 | Stop reason: HOSPADM

## 2022-07-25 RX ORDER — ALUMINA, MAGNESIA, AND SIMETHICONE 2400; 2400; 240 MG/30ML; MG/30ML; MG/30ML
15 SUSPENSION ORAL EVERY 6 HOURS PRN
Status: DISCONTINUED | OUTPATIENT
Start: 2022-07-25 | End: 2022-08-02 | Stop reason: HOSPADM

## 2022-07-25 RX ORDER — ECHINACEA PURPUREA EXTRACT 125 MG
2 TABLET ORAL AS NEEDED
Status: DISCONTINUED | OUTPATIENT
Start: 2022-07-25 | End: 2022-08-02 | Stop reason: HOSPADM

## 2022-07-25 RX ORDER — ONDANSETRON 4 MG/1
4 TABLET, FILM COATED ORAL EVERY 6 HOURS PRN
Status: DISCONTINUED | OUTPATIENT
Start: 2022-07-25 | End: 2022-08-02 | Stop reason: HOSPADM

## 2022-07-25 RX ORDER — IBUPROFEN 400 MG/1
400 TABLET ORAL EVERY 6 HOURS PRN
Status: DISCONTINUED | OUTPATIENT
Start: 2022-07-25 | End: 2022-08-02 | Stop reason: HOSPADM

## 2022-07-25 RX ORDER — HYDROXYZINE 50 MG/1
50 TABLET, FILM COATED ORAL EVERY 6 HOURS PRN
Status: DISCONTINUED | OUTPATIENT
Start: 2022-07-25 | End: 2022-08-02 | Stop reason: HOSPADM

## 2022-07-25 RX ORDER — BENZTROPINE MESYLATE 1 MG/1
2 TABLET ORAL ONCE AS NEEDED
Status: DISCONTINUED | OUTPATIENT
Start: 2022-07-25 | End: 2022-08-02 | Stop reason: HOSPADM

## 2022-07-25 RX ADMIN — HYDROXYZINE HYDROCHLORIDE 50 MG: 50 TABLET ORAL at 21:57

## 2022-07-26 ENCOUNTER — APPOINTMENT (OUTPATIENT)
Dept: GENERAL RADIOLOGY | Facility: HOSPITAL | Age: 67
End: 2022-07-26

## 2022-07-26 ENCOUNTER — APPOINTMENT (OUTPATIENT)
Dept: CT IMAGING | Facility: HOSPITAL | Age: 67
End: 2022-07-26

## 2022-07-26 PROBLEM — K44.9 HIATAL HERNIA WITH GERD: Status: ACTIVE | Noted: 2022-07-26

## 2022-07-26 PROBLEM — M54.50 LUMBAR BACK PAIN: Status: ACTIVE | Noted: 2022-07-26

## 2022-07-26 PROBLEM — M50.90 CERVICAL BACK PAIN WITH EVIDENCE OF DISC DISEASE: Status: ACTIVE | Noted: 2022-07-26

## 2022-07-26 PROBLEM — K21.9 HIATAL HERNIA WITH GERD: Status: ACTIVE | Noted: 2022-07-26

## 2022-07-26 PROBLEM — F43.10 POST TRAUMATIC STRESS DISORDER (PTSD): Status: ACTIVE | Noted: 2022-07-26

## 2022-07-26 PROBLEM — F33.3 MAJOR DEPRESSIVE DISORDER, RECURRENT EPISODE, SEVERE, WITH PSYCHOSIS (HCC): Status: ACTIVE | Noted: 2022-07-26

## 2022-07-26 PROBLEM — R07.9 CHEST PAIN: Status: ACTIVE | Noted: 2022-07-26

## 2022-07-26 PROBLEM — E03.9 HYPOTHYROIDISM: Status: ACTIVE | Noted: 2022-07-26

## 2022-07-26 LAB
BILIRUB UR QL STRIP: NEGATIVE
CHOLEST SERPL-MCNC: 172 MG/DL (ref 0–200)
CLARITY UR: CLEAR
COLOR UR: YELLOW
GLUCOSE UR STRIP-MCNC: NEGATIVE MG/DL
HBA1C MFR BLD: 4.7 % (ref 4.8–5.6)
HDLC SERPL-MCNC: 67 MG/DL (ref 40–60)
HGB UR QL STRIP.AUTO: NEGATIVE
KETONES UR QL STRIP: NEGATIVE
LDLC SERPL CALC-MCNC: 94 MG/DL (ref 0–100)
LDLC/HDLC SERPL: 1.4 {RATIO}
LEUKOCYTE ESTERASE UR QL STRIP.AUTO: ABNORMAL
NITRITE UR QL STRIP: POSITIVE
PH UR STRIP.AUTO: 6 [PH] (ref 5–8)
PROT UR QL STRIP: NEGATIVE
SP GR UR STRIP: 1.01 (ref 1–1.03)
T4 FREE SERPL-MCNC: 1.04 NG/DL (ref 0.93–1.7)
TRIGL SERPL-MCNC: 55 MG/DL (ref 0–150)
TSH SERPL DL<=0.05 MIU/L-ACNC: 1.4 UIU/ML (ref 0.27–4.2)
UROBILINOGEN UR QL STRIP: ABNORMAL
VLDLC SERPL-MCNC: 11 MG/DL (ref 5–40)

## 2022-07-26 PROCEDURE — 71046 X-RAY EXAM CHEST 2 VIEWS: CPT | Performed by: RADIOLOGY

## 2022-07-26 PROCEDURE — 72020 X-RAY EXAM OF SPINE 1 VIEW: CPT

## 2022-07-26 PROCEDURE — 83036 HEMOGLOBIN GLYCOSYLATED A1C: CPT | Performed by: PSYCHIATRY & NEUROLOGY

## 2022-07-26 PROCEDURE — 25010000002 IOPAMIDOL 61 % SOLUTION: Performed by: PSYCHIATRY & NEUROLOGY

## 2022-07-26 PROCEDURE — 63710000001 ONDANSETRON PER 8 MG: Performed by: PSYCHIATRY & NEUROLOGY

## 2022-07-26 PROCEDURE — 99223 1ST HOSP IP/OBS HIGH 75: CPT | Performed by: PSYCHIATRY & NEUROLOGY

## 2022-07-26 PROCEDURE — 71046 X-RAY EXAM CHEST 2 VIEWS: CPT

## 2022-07-26 PROCEDURE — 72020 X-RAY EXAM OF SPINE 1 VIEW: CPT | Performed by: RADIOLOGY

## 2022-07-26 PROCEDURE — 93010 ELECTROCARDIOGRAM REPORT: CPT | Performed by: INTERNAL MEDICINE

## 2022-07-26 PROCEDURE — 84439 ASSAY OF FREE THYROXINE: CPT | Performed by: PSYCHIATRY & NEUROLOGY

## 2022-07-26 PROCEDURE — 70460 CT HEAD/BRAIN W/DYE: CPT

## 2022-07-26 PROCEDURE — 81003 URINALYSIS AUTO W/O SCOPE: CPT | Performed by: PSYCHIATRY & NEUROLOGY

## 2022-07-26 PROCEDURE — 99222 1ST HOSP IP/OBS MODERATE 55: CPT | Performed by: SPECIALIST

## 2022-07-26 PROCEDURE — 70460 CT HEAD/BRAIN W/DYE: CPT | Performed by: RADIOLOGY

## 2022-07-26 PROCEDURE — 80061 LIPID PANEL: CPT | Performed by: PSYCHIATRY & NEUROLOGY

## 2022-07-26 PROCEDURE — 84443 ASSAY THYROID STIM HORMONE: CPT | Performed by: PSYCHIATRY & NEUROLOGY

## 2022-07-26 RX ORDER — PANTOPRAZOLE SODIUM 40 MG/1
40 TABLET, DELAYED RELEASE ORAL 2 TIMES DAILY
Status: DISCONTINUED | OUTPATIENT
Start: 2022-07-26 | End: 2022-08-02 | Stop reason: HOSPADM

## 2022-07-26 RX ORDER — OXYCODONE AND ACETAMINOPHEN 10; 325 MG/1; MG/1
1 TABLET ORAL DAILY PRN
COMMUNITY
End: 2022-08-02 | Stop reason: HOSPADM

## 2022-07-26 RX ORDER — LEVOTHYROXINE SODIUM 0.05 MG/1
50 TABLET ORAL DAILY
Status: DISCONTINUED | OUTPATIENT
Start: 2022-07-26 | End: 2022-08-02 | Stop reason: HOSPADM

## 2022-07-26 RX ORDER — GABAPENTIN 400 MG/1
800 CAPSULE ORAL 3 TIMES DAILY PRN
Status: CANCELLED | OUTPATIENT
Start: 2022-07-26

## 2022-07-26 RX ORDER — ESCITALOPRAM OXALATE 20 MG/1
20 TABLET ORAL 2 TIMES DAILY
Status: ON HOLD | COMMUNITY
End: 2022-07-26

## 2022-07-26 RX ORDER — TIZANIDINE 4 MG/1
4 TABLET ORAL EVERY 8 HOURS PRN
COMMUNITY
End: 2022-08-02 | Stop reason: HOSPADM

## 2022-07-26 RX ORDER — MIRTAZAPINE 15 MG/1
15 TABLET, FILM COATED ORAL NIGHTLY
Status: DISCONTINUED | OUTPATIENT
Start: 2022-07-26 | End: 2022-07-28

## 2022-07-26 RX ORDER — TIZANIDINE 4 MG/1
4 TABLET ORAL EVERY 8 HOURS PRN
Status: CANCELLED | OUTPATIENT
Start: 2022-07-26

## 2022-07-26 RX ORDER — OXYCODONE AND ACETAMINOPHEN 10; 325 MG/1; MG/1
1 TABLET ORAL DAILY PRN
Status: CANCELLED | OUTPATIENT
Start: 2022-07-26

## 2022-07-26 RX ORDER — ECHINACEA PURPUREA EXTRACT 125 MG
1 TABLET ORAL AS NEEDED
Status: CANCELLED | OUTPATIENT
Start: 2022-07-26

## 2022-07-26 RX ORDER — SODIUM CHLORIDE/ALOE VERA
1 GEL (GRAM) NASAL AS NEEDED
COMMUNITY

## 2022-07-26 RX ORDER — PANTOPRAZOLE SODIUM 40 MG/1
40 TABLET, DELAYED RELEASE ORAL 2 TIMES DAILY
COMMUNITY

## 2022-07-26 RX ORDER — POLYVINYL ALCOHOL 14 MG/ML
1 SOLUTION/ DROPS OPHTHALMIC AS NEEDED
COMMUNITY

## 2022-07-26 RX ADMIN — IOPAMIDOL 87 ML: 612 INJECTION, SOLUTION INTRAVENOUS at 14:32

## 2022-07-26 RX ADMIN — ACETAMINOPHEN 325MG 650 MG: 325 TABLET ORAL at 12:11

## 2022-07-26 RX ADMIN — HYDROXYZINE HYDROCHLORIDE 50 MG: 50 TABLET ORAL at 21:03

## 2022-07-26 RX ADMIN — PANTOPRAZOLE SODIUM 40 MG: 40 TABLET, DELAYED RELEASE ORAL at 12:11

## 2022-07-26 RX ADMIN — POLYVINYL ALCOHOL, POVIDONE 1 DROP: 14; 6 SOLUTION/ DROPS OPHTHALMIC at 21:01

## 2022-07-26 RX ADMIN — MIRTAZAPINE 15 MG: 15 TABLET, FILM COATED ORAL at 21:01

## 2022-07-26 RX ADMIN — SALINE NASAL SPRAY 2 SPRAY: 1.5 SOLUTION NASAL at 21:02

## 2022-07-26 RX ADMIN — ONDANSETRON HYDROCHLORIDE 4 MG: 4 TABLET, FILM COATED ORAL at 17:09

## 2022-07-26 RX ADMIN — PANTOPRAZOLE SODIUM 40 MG: 40 TABLET, DELAYED RELEASE ORAL at 21:01

## 2022-07-26 RX ADMIN — LEVOTHYROXINE SODIUM 50 MCG: 50 TABLET ORAL at 12:11

## 2022-07-26 RX ADMIN — SERTRALINE 50 MG: 50 TABLET, FILM COATED ORAL at 12:11

## 2022-07-27 ENCOUNTER — APPOINTMENT (OUTPATIENT)
Dept: NUCLEAR MEDICINE | Facility: HOSPITAL | Age: 67
End: 2022-07-27

## 2022-07-27 LAB
BACTERIA SPEC AEROBE CULT: ABNORMAL
BH CV ECHO MEAS - ACS: 1.5 CM
BH CV ECHO MEAS - AO MAX PG: 8.2 MMHG
BH CV ECHO MEAS - AO MEAN PG: 4 MMHG
BH CV ECHO MEAS - AO ROOT DIAM: 2.5 CM
BH CV ECHO MEAS - AO V2 MAX: 143 CM/SEC
BH CV ECHO MEAS - AO V2 VTI: 25.5 CM
BH CV ECHO MEAS - EDV(CUBED): 46.7 ML
BH CV ECHO MEAS - EDV(MOD-SP4): 39.9 ML
BH CV ECHO MEAS - EF(MOD-SP4): 57.1 %
BH CV ECHO MEAS - ESV(CUBED): 12.2 ML
BH CV ECHO MEAS - ESV(MOD-SP4): 17.1 ML
BH CV ECHO MEAS - FS: 36.1 %
BH CV ECHO MEAS - IVS/LVPW: 0.92 CM
BH CV ECHO MEAS - IVSD: 1.1 CM
BH CV ECHO MEAS - LA DIMENSION: 3.4 CM
BH CV ECHO MEAS - LAT PEAK E' VEL: 6.9 CM/SEC
BH CV ECHO MEAS - LV DIASTOLIC VOL/BSA (35-75): 23.6 CM2
BH CV ECHO MEAS - LV MASS(C)D: 132.7 GRAMS
BH CV ECHO MEAS - LV MAX PG: 4.3 MMHG
BH CV ECHO MEAS - LV MEAN PG: 2 MMHG
BH CV ECHO MEAS - LV SYSTOLIC VOL/BSA (12-30): 10.1 CM2
BH CV ECHO MEAS - LV V1 MAX: 104 CM/SEC
BH CV ECHO MEAS - LV V1 VTI: 18 CM
BH CV ECHO MEAS - LVIDD: 3.6 CM
BH CV ECHO MEAS - LVIDS: 2.3 CM
BH CV ECHO MEAS - LVPWD: 1.2 CM
BH CV ECHO MEAS - MED PEAK E' VEL: 6.4 CM/SEC
BH CV ECHO MEAS - MV A MAX VEL: 89 CM/SEC
BH CV ECHO MEAS - MV DEC SLOPE: 250 CM/SEC2
BH CV ECHO MEAS - MV DEC TIME: 0.26 MSEC
BH CV ECHO MEAS - MV E MAX VEL: 61.6 CM/SEC
BH CV ECHO MEAS - MV E/A: 0.69
BH CV ECHO MEAS - MV MAX PG: 3.1 MMHG
BH CV ECHO MEAS - MV MEAN PG: 2 MMHG
BH CV ECHO MEAS - MV V2 VTI: 18.2 CM
BH CV ECHO MEAS - PA ACC TIME: 0.07 SEC
BH CV ECHO MEAS - PA PR(ACCEL): 48.8 MMHG
BH CV ECHO MEAS - PA V2 MAX: 112 CM/SEC
BH CV ECHO MEAS - PI END-D VEL: 122.5 CM/SEC
BH CV ECHO MEAS - RAP SYSTOLE: 10 MMHG
BH CV ECHO MEAS - RVSP: 18.3 MMHG
BH CV ECHO MEAS - SI(MOD-SP4): 13.5 ML/M2
BH CV ECHO MEAS - SV(MOD-SP4): 22.8 ML
BH CV ECHO MEAS - TAPSE (>1.6): 2.11 CM
BH CV ECHO MEAS - TR MAX PG: 8.3 MMHG
BH CV ECHO MEAS - TR MAX VEL: 144 CM/SEC
BH CV ECHO MEASUREMENTS AVERAGE E/E' RATIO: 9.26
LEFT ATRIUM VOLUME INDEX: 16.8 ML/M2
MAXIMAL PREDICTED HEART RATE: 155 BPM
QT INTERVAL: 442 MS
QTC INTERVAL: 426 MS
STRESS TARGET HR: 132 BPM

## 2022-07-27 PROCEDURE — 99232 SBSQ HOSP IP/OBS MODERATE 35: CPT | Performed by: PSYCHIATRY & NEUROLOGY

## 2022-07-27 PROCEDURE — 93017 CV STRESS TEST TRACING ONLY: CPT

## 2022-07-27 PROCEDURE — 25010000002 REGADENOSON 0.4 MG/5ML SOLUTION: Performed by: PSYCHIATRY & NEUROLOGY

## 2022-07-27 PROCEDURE — 78452 HT MUSCLE IMAGE SPECT MULT: CPT | Performed by: SPECIALIST

## 2022-07-27 PROCEDURE — 78452 HT MUSCLE IMAGE SPECT MULT: CPT

## 2022-07-27 PROCEDURE — 93018 CV STRESS TEST I&R ONLY: CPT | Performed by: SPECIALIST

## 2022-07-27 PROCEDURE — A9500 TC99M SESTAMIBI: HCPCS | Performed by: PSYCHIATRY & NEUROLOGY

## 2022-07-27 PROCEDURE — 0 TECHNETIUM SESTAMIBI: Performed by: PSYCHIATRY & NEUROLOGY

## 2022-07-27 RX ORDER — NICOTINE 21 MG/24HR
1 PATCH, TRANSDERMAL 24 HOURS TRANSDERMAL
Status: DISCONTINUED | OUTPATIENT
Start: 2022-07-27 | End: 2022-08-02 | Stop reason: HOSPADM

## 2022-07-27 RX ORDER — ARIPIPRAZOLE 2 MG/1
2 TABLET ORAL DAILY
Status: DISCONTINUED | OUTPATIENT
Start: 2022-07-27 | End: 2022-08-02 | Stop reason: HOSPADM

## 2022-07-27 RX ADMIN — MIRTAZAPINE 15 MG: 15 TABLET, FILM COATED ORAL at 21:07

## 2022-07-27 RX ADMIN — ACETAMINOPHEN 325MG 650 MG: 325 TABLET ORAL at 13:25

## 2022-07-27 RX ADMIN — LEVOTHYROXINE SODIUM 50 MCG: 50 TABLET ORAL at 08:11

## 2022-07-27 RX ADMIN — SERTRALINE 50 MG: 50 TABLET, FILM COATED ORAL at 08:11

## 2022-07-27 RX ADMIN — REGADENOSON 0.4 MG: 0.08 INJECTION, SOLUTION INTRAVENOUS at 11:32

## 2022-07-27 RX ADMIN — PANTOPRAZOLE SODIUM 40 MG: 40 TABLET, DELAYED RELEASE ORAL at 08:11

## 2022-07-27 RX ADMIN — POLYVINYL ALCOHOL, POVIDONE 1 DROP: 14; 6 SOLUTION/ DROPS OPHTHALMIC at 17:25

## 2022-07-27 RX ADMIN — ARIPIPRAZOLE 2 MG: 2 TABLET ORAL at 13:25

## 2022-07-27 RX ADMIN — NICOTINE 1 PATCH: 14 PATCH, EXTENDED RELEASE TRANSDERMAL at 13:25

## 2022-07-27 RX ADMIN — TECHNETIUM TC 99M SESTAMIBI 1 DOSE: 1 INJECTION INTRAVENOUS at 11:32

## 2022-07-27 RX ADMIN — ACETAMINOPHEN 325MG 650 MG: 325 TABLET ORAL at 21:22

## 2022-07-27 RX ADMIN — MAGNESIUM HYDROXIDE 10 ML: 2400 SUSPENSION ORAL at 17:23

## 2022-07-27 RX ADMIN — TECHNETIUM TC 99M SESTAMIBI 1 DOSE: 1 INJECTION INTRAVENOUS at 10:15

## 2022-07-27 RX ADMIN — PANTOPRAZOLE SODIUM 40 MG: 40 TABLET, DELAYED RELEASE ORAL at 21:07

## 2022-07-28 LAB
BH CV NUCLEAR PRIOR STUDY: 3
BH CV REST NUCLEAR ISOTOPE DOSE: 9.5 MCI
BH CV STRESS BP STAGE 1: NORMAL
BH CV STRESS COMMENTS STAGE 1: NORMAL
BH CV STRESS DOSE REGADENOSON STAGE 1: 0.4
BH CV STRESS DURATION MIN STAGE 1: 0
BH CV STRESS DURATION SEC STAGE 1: 10
BH CV STRESS HR STAGE 1: 90
BH CV STRESS NUCLEAR ISOTOPE DOSE: 27.6 MCI
BH CV STRESS PROTOCOL 1: NORMAL
BH CV STRESS RECOVERY BP: NORMAL MMHG
BH CV STRESS RECOVERY HR: 88 BPM
BH CV STRESS STAGE 1: 1
LV EF NUC BP: 80 %
MAXIMAL PREDICTED HEART RATE: 155 BPM
PERCENT MAX PREDICTED HR: 58.06 %
SARS-COV-2 RNA RESP QL NAA+PROBE: NOT DETECTED
STRESS BASELINE BP: NORMAL MMHG
STRESS BASELINE HR: 53 BPM
STRESS PERCENT HR: 68 %
STRESS POST PEAK BP: NORMAL MMHG
STRESS POST PEAK HR: 90 BPM
STRESS TARGET HR: 132 BPM

## 2022-07-28 PROCEDURE — 99232 SBSQ HOSP IP/OBS MODERATE 35: CPT | Performed by: INTERNAL MEDICINE

## 2022-07-28 PROCEDURE — 99232 SBSQ HOSP IP/OBS MODERATE 35: CPT | Performed by: PSYCHIATRY & NEUROLOGY

## 2022-07-28 PROCEDURE — 87635 SARS-COV-2 COVID-19 AMP PRB: CPT | Performed by: NURSE PRACTITIONER

## 2022-07-28 RX ORDER — POLYETHYLENE GLYCOL 3350 17 G/17G
17 POWDER, FOR SOLUTION ORAL DAILY PRN
Status: DISCONTINUED | OUTPATIENT
Start: 2022-07-28 | End: 2022-08-02 | Stop reason: HOSPADM

## 2022-07-28 RX ORDER — MIRTAZAPINE 15 MG/1
30 TABLET, FILM COATED ORAL NIGHTLY
Status: DISCONTINUED | OUTPATIENT
Start: 2022-07-28 | End: 2022-08-02 | Stop reason: HOSPADM

## 2022-07-28 RX ADMIN — HYDROXYZINE HYDROCHLORIDE 50 MG: 50 TABLET ORAL at 03:35

## 2022-07-28 RX ADMIN — NICOTINE 1 PATCH: 14 PATCH, EXTENDED RELEASE TRANSDERMAL at 08:16

## 2022-07-28 RX ADMIN — POLYETHYLENE GLYCOL (3350) 17 G: 17 POWDER, FOR SOLUTION ORAL at 11:51

## 2022-07-28 RX ADMIN — SALINE NASAL SPRAY 2 SPRAY: 1.5 SOLUTION NASAL at 08:17

## 2022-07-28 RX ADMIN — IBUPROFEN 400 MG: 400 TABLET, FILM COATED ORAL at 08:17

## 2022-07-28 RX ADMIN — PANTOPRAZOLE SODIUM 40 MG: 40 TABLET, DELAYED RELEASE ORAL at 08:16

## 2022-07-28 RX ADMIN — LEVOTHYROXINE SODIUM 50 MCG: 50 TABLET ORAL at 08:17

## 2022-07-28 RX ADMIN — SERTRALINE 50 MG: 50 TABLET, FILM COATED ORAL at 08:16

## 2022-07-28 RX ADMIN — MIRTAZAPINE 30 MG: 15 TABLET, FILM COATED ORAL at 21:08

## 2022-07-28 RX ADMIN — POLYVINYL ALCOHOL, POVIDONE 1 DROP: 14; 6 SOLUTION/ DROPS OPHTHALMIC at 08:17

## 2022-07-28 RX ADMIN — ARIPIPRAZOLE 2 MG: 2 TABLET ORAL at 08:16

## 2022-07-28 RX ADMIN — PANTOPRAZOLE SODIUM 40 MG: 40 TABLET, DELAYED RELEASE ORAL at 21:08

## 2022-07-28 RX ADMIN — ACETAMINOPHEN 325MG 650 MG: 325 TABLET ORAL at 21:08

## 2022-07-28 RX ADMIN — IBUPROFEN 400 MG: 400 TABLET, FILM COATED ORAL at 14:44

## 2022-07-29 ENCOUNTER — ANESTHESIA (OUTPATIENT)
Dept: CARDIOLOGY | Facility: HOSPITAL | Age: 67
End: 2022-07-29

## 2022-07-29 ENCOUNTER — HOSPITAL ENCOUNTER (OUTPATIENT)
Dept: CARDIOLOGY | Facility: HOSPITAL | Age: 67
Setting detail: HOSPITAL OUTPATIENT SURGERY
Discharge: HOME | End: 2022-07-29

## 2022-07-29 ENCOUNTER — ANESTHESIA EVENT (OUTPATIENT)
Dept: CARDIOLOGY | Facility: HOSPITAL | Age: 67
End: 2022-07-29

## 2022-07-29 VITALS
DIASTOLIC BLOOD PRESSURE: 89 MMHG | HEART RATE: 62 BPM | OXYGEN SATURATION: 99 % | SYSTOLIC BLOOD PRESSURE: 147 MMHG | RESPIRATION RATE: 12 BRPM

## 2022-07-29 DIAGNOSIS — R93.1 ABNORMAL ECHOCARDIOGRAM: ICD-10-CM

## 2022-07-29 DIAGNOSIS — R07.9 CHEST PAIN, UNSPECIFIED TYPE: ICD-10-CM

## 2022-07-29 DIAGNOSIS — R93.1 ABNORMAL ECHOCARDIOGRAM: Primary | ICD-10-CM

## 2022-07-29 LAB
MAXIMAL PREDICTED HEART RATE: 155 BPM
STRESS TARGET HR: 132 BPM

## 2022-07-29 PROCEDURE — 93325 DOPPLER ECHO COLOR FLOW MAPG: CPT | Performed by: SPECIALIST

## 2022-07-29 PROCEDURE — 93321 DOPPLER ECHO F-UP/LMTD STD: CPT

## 2022-07-29 PROCEDURE — 25010000002 PROPOFOL 10 MG/ML EMULSION: Performed by: NURSE ANESTHETIST, CERTIFIED REGISTERED

## 2022-07-29 PROCEDURE — 93325 DOPPLER ECHO COLOR FLOW MAPG: CPT

## 2022-07-29 PROCEDURE — 93321 DOPPLER ECHO F-UP/LMTD STD: CPT | Performed by: SPECIALIST

## 2022-07-29 PROCEDURE — 93312 ECHO TRANSESOPHAGEAL: CPT

## 2022-07-29 PROCEDURE — 99232 SBSQ HOSP IP/OBS MODERATE 35: CPT | Performed by: PSYCHIATRY & NEUROLOGY

## 2022-07-29 PROCEDURE — 93312 ECHO TRANSESOPHAGEAL: CPT | Performed by: SPECIALIST

## 2022-07-29 RX ORDER — LIDOCAINE HYDROCHLORIDE 20 MG/ML
INJECTION, SOLUTION INFILTRATION; PERINEURAL AS NEEDED
Status: DISCONTINUED | OUTPATIENT
Start: 2022-07-29 | End: 2022-07-29 | Stop reason: SURG

## 2022-07-29 RX ORDER — PROPOFOL 10 MG/ML
VIAL (ML) INTRAVENOUS AS NEEDED
Status: DISCONTINUED | OUTPATIENT
Start: 2022-07-29 | End: 2022-07-29 | Stop reason: SURG

## 2022-07-29 RX ORDER — SODIUM CHLORIDE 9 MG/ML
INJECTION, SOLUTION INTRAVENOUS CONTINUOUS PRN
Status: DISCONTINUED | OUTPATIENT
Start: 2022-07-29 | End: 2022-07-29 | Stop reason: SURG

## 2022-07-29 RX ADMIN — HYDROXYZINE HYDROCHLORIDE 50 MG: 50 TABLET ORAL at 23:53

## 2022-07-29 RX ADMIN — PROPOFOL 40 MG: 10 INJECTION, EMULSION INTRAVENOUS at 14:21

## 2022-07-29 RX ADMIN — LEVOTHYROXINE SODIUM 50 MCG: 50 TABLET ORAL at 15:14

## 2022-07-29 RX ADMIN — SODIUM CHLORIDE: 0.9 INJECTION, SOLUTION INTRAVENOUS at 14:17

## 2022-07-29 RX ADMIN — MIRTAZAPINE 30 MG: 15 TABLET, FILM COATED ORAL at 21:10

## 2022-07-29 RX ADMIN — PROPOFOL 60 MG: 10 INJECTION, EMULSION INTRAVENOUS at 14:18

## 2022-07-29 RX ADMIN — LIDOCAINE HYDROCHLORIDE 100 MG: 20 INJECTION, SOLUTION INFILTRATION; PERINEURAL at 14:18

## 2022-07-29 RX ADMIN — MAGNESIUM HYDROXIDE 10 ML: 2400 SUSPENSION ORAL at 15:19

## 2022-07-29 RX ADMIN — SERTRALINE 100 MG: 50 TABLET, FILM COATED ORAL at 15:14

## 2022-07-29 RX ADMIN — IBUPROFEN 400 MG: 400 TABLET, FILM COATED ORAL at 15:17

## 2022-07-29 RX ADMIN — NICOTINE 1 PATCH: 14 PATCH, EXTENDED RELEASE TRANSDERMAL at 15:15

## 2022-07-29 RX ADMIN — PANTOPRAZOLE SODIUM 40 MG: 40 TABLET, DELAYED RELEASE ORAL at 15:14

## 2022-07-29 RX ADMIN — ARIPIPRAZOLE 2 MG: 2 TABLET ORAL at 15:14

## 2022-07-29 RX ADMIN — DICLOFENAC 2 G: 10 GEL TOPICAL at 18:08

## 2022-07-29 RX ADMIN — PANTOPRAZOLE SODIUM 40 MG: 40 TABLET, DELAYED RELEASE ORAL at 21:10

## 2022-07-29 RX ADMIN — SALINE NASAL SPRAY 2 SPRAY: 1.5 SOLUTION NASAL at 05:56

## 2022-07-29 RX ADMIN — POLYVINYL ALCOHOL, POVIDONE 1 DROP: 14; 6 SOLUTION/ DROPS OPHTHALMIC at 05:56

## 2022-07-30 PROCEDURE — 99231 SBSQ HOSP IP/OBS SF/LOW 25: CPT | Performed by: PSYCHIATRY & NEUROLOGY

## 2022-07-30 RX ORDER — CHOLECALCIFEROL (VITAMIN D3) 125 MCG
5 CAPSULE ORAL NIGHTLY PRN
Status: DISCONTINUED | OUTPATIENT
Start: 2022-07-30 | End: 2022-08-02 | Stop reason: HOSPADM

## 2022-07-30 RX ADMIN — IBUPROFEN 400 MG: 400 TABLET, FILM COATED ORAL at 15:14

## 2022-07-30 RX ADMIN — MIRTAZAPINE 30 MG: 15 TABLET, FILM COATED ORAL at 20:28

## 2022-07-30 RX ADMIN — SALINE NASAL SPRAY 2 SPRAY: 1.5 SOLUTION NASAL at 13:47

## 2022-07-30 RX ADMIN — IBUPROFEN 400 MG: 400 TABLET, FILM COATED ORAL at 08:31

## 2022-07-30 RX ADMIN — SERTRALINE 100 MG: 50 TABLET, FILM COATED ORAL at 08:32

## 2022-07-30 RX ADMIN — DICLOFENAC 2 G: 10 GEL TOPICAL at 16:06

## 2022-07-30 RX ADMIN — POLYVINYL ALCOHOL, POVIDONE 1 DROP: 14; 6 SOLUTION/ DROPS OPHTHALMIC at 13:47

## 2022-07-30 RX ADMIN — PANTOPRAZOLE SODIUM 40 MG: 40 TABLET, DELAYED RELEASE ORAL at 08:32

## 2022-07-30 RX ADMIN — NICOTINE 1 PATCH: 14 PATCH, EXTENDED RELEASE TRANSDERMAL at 08:31

## 2022-07-30 RX ADMIN — ARIPIPRAZOLE 2 MG: 2 TABLET ORAL at 08:31

## 2022-07-30 RX ADMIN — LEVOTHYROXINE SODIUM 50 MCG: 50 TABLET ORAL at 08:31

## 2022-07-30 RX ADMIN — HYDROXYZINE HYDROCHLORIDE 50 MG: 50 TABLET ORAL at 20:28

## 2022-07-30 RX ADMIN — PANTOPRAZOLE SODIUM 40 MG: 40 TABLET, DELAYED RELEASE ORAL at 20:28

## 2022-07-31 PROCEDURE — 99231 SBSQ HOSP IP/OBS SF/LOW 25: CPT | Performed by: PSYCHIATRY & NEUROLOGY

## 2022-07-31 PROCEDURE — 63710000001 ONDANSETRON PER 8 MG: Performed by: PSYCHIATRY & NEUROLOGY

## 2022-07-31 RX ADMIN — MAGNESIUM HYDROXIDE 10 ML: 2400 SUSPENSION ORAL at 13:27

## 2022-07-31 RX ADMIN — PANTOPRAZOLE SODIUM 40 MG: 40 TABLET, DELAYED RELEASE ORAL at 22:45

## 2022-07-31 RX ADMIN — IBUPROFEN 400 MG: 400 TABLET, FILM COATED ORAL at 15:31

## 2022-07-31 RX ADMIN — MIRTAZAPINE 30 MG: 15 TABLET, FILM COATED ORAL at 22:45

## 2022-07-31 RX ADMIN — SALINE NASAL SPRAY 2 SPRAY: 1.5 SOLUTION NASAL at 12:57

## 2022-07-31 RX ADMIN — ARIPIPRAZOLE 2 MG: 2 TABLET ORAL at 08:07

## 2022-07-31 RX ADMIN — ONDANSETRON HYDROCHLORIDE 4 MG: 4 TABLET, FILM COATED ORAL at 12:57

## 2022-07-31 RX ADMIN — PANTOPRAZOLE SODIUM 40 MG: 40 TABLET, DELAYED RELEASE ORAL at 08:07

## 2022-07-31 RX ADMIN — LEVOTHYROXINE SODIUM 50 MCG: 50 TABLET ORAL at 08:07

## 2022-07-31 RX ADMIN — NICOTINE 1 PATCH: 14 PATCH, EXTENDED RELEASE TRANSDERMAL at 08:07

## 2022-07-31 RX ADMIN — IBUPROFEN 400 MG: 400 TABLET, FILM COATED ORAL at 05:41

## 2022-07-31 RX ADMIN — SERTRALINE 100 MG: 50 TABLET, FILM COATED ORAL at 08:07

## 2022-07-31 RX ADMIN — IBUPROFEN 400 MG: 400 TABLET, FILM COATED ORAL at 22:47

## 2022-08-01 PROCEDURE — 63710000001 ONDANSETRON PER 8 MG: Performed by: PSYCHIATRY & NEUROLOGY

## 2022-08-01 PROCEDURE — 99232 SBSQ HOSP IP/OBS MODERATE 35: CPT | Performed by: PSYCHIATRY & NEUROLOGY

## 2022-08-01 RX ADMIN — ARIPIPRAZOLE 2 MG: 2 TABLET ORAL at 08:23

## 2022-08-01 RX ADMIN — MIRTAZAPINE 30 MG: 15 TABLET, FILM COATED ORAL at 22:59

## 2022-08-01 RX ADMIN — PANTOPRAZOLE SODIUM 40 MG: 40 TABLET, DELAYED RELEASE ORAL at 22:59

## 2022-08-01 RX ADMIN — SALINE NASAL SPRAY 2 SPRAY: 1.5 SOLUTION NASAL at 12:28

## 2022-08-01 RX ADMIN — IBUPROFEN 400 MG: 400 TABLET, FILM COATED ORAL at 21:05

## 2022-08-01 RX ADMIN — POLYVINYL ALCOHOL, POVIDONE 1 DROP: 14; 6 SOLUTION/ DROPS OPHTHALMIC at 12:28

## 2022-08-01 RX ADMIN — LEVOTHYROXINE SODIUM 50 MCG: 50 TABLET ORAL at 08:23

## 2022-08-01 RX ADMIN — MAGNESIUM HYDROXIDE 10 ML: 2400 SUSPENSION ORAL at 10:07

## 2022-08-01 RX ADMIN — ONDANSETRON HYDROCHLORIDE 4 MG: 4 TABLET, FILM COATED ORAL at 17:56

## 2022-08-01 RX ADMIN — NICOTINE 1 PATCH: 14 PATCH, EXTENDED RELEASE TRANSDERMAL at 08:23

## 2022-08-01 RX ADMIN — DICLOFENAC 2 G: 10 GEL TOPICAL at 12:28

## 2022-08-01 RX ADMIN — PANTOPRAZOLE SODIUM 40 MG: 40 TABLET, DELAYED RELEASE ORAL at 08:23

## 2022-08-01 RX ADMIN — SERTRALINE 100 MG: 50 TABLET, FILM COATED ORAL at 08:23

## 2022-08-01 RX ADMIN — HYDROXYZINE HYDROCHLORIDE 50 MG: 50 TABLET ORAL at 03:45

## 2022-08-01 RX ADMIN — POLYETHYLENE GLYCOL (3350) 17 G: 17 POWDER, FOR SOLUTION ORAL at 12:28

## 2022-08-01 RX ADMIN — IBUPROFEN 400 MG: 400 TABLET, FILM COATED ORAL at 13:46

## 2022-08-01 NOTE — PLAN OF CARE
Problem: Adult Behavioral Health Plan of Care  Goal: Plan of Care Review  Outcome: Ongoing, Progressing  Flowsheets  Taken 8/1/2022 0455  Progress: improving  Plan of Care Reviewed With: patient  Patient Agreement with Plan of Care: agrees  Outcome Evaluation:   Pt is calm and cooperative with staff   pt noted with difficulty staying and falling asleep   Denies SI and HI   rates anxiety 8 and depression 8.  Taken 7/31/2022 1934  Plan of Care Reviewed With: patient  Patient Agreement with Plan of Care: agrees  Goal: Patient-Specific Goal (Individualization)  Outcome: Ongoing, Progressing  Goal: Adheres to Safety Considerations for Self and Others  Outcome: Ongoing, Progressing  Intervention: Develop and Maintain Individualized Safety Plan  Recent Flowsheet Documentation  Taken 8/1/2022 0405 by Margot Rausch RN  Safety Measures:   safety rounds completed   safety plan reviewed  Taken 8/1/2022 0215 by Margot Rausch RN  Safety Measures: safety rounds completed  Taken 8/1/2022 0001 by Margot Rausch RN  Safety Measures:   safety plan reviewed   safety rounds completed  Taken 7/31/2022 2201 by Margot Rausch RN  Safety Measures:   safety rounds completed   safety plan reviewed  Goal: Absence of New-Onset Illness or Injury  Outcome: Ongoing, Progressing  Intervention: Identify and Manage Fall Risk  Recent Flowsheet Documentation  Taken 8/1/2022 0405 by Margot Rausch RN  Safety Measures:   safety rounds completed   safety plan reviewed  Taken 8/1/2022 0215 by Margot Rausch RN  Safety Measures: safety rounds completed  Taken 8/1/2022 0001 by Margot Rausch RN  Safety Measures:   safety plan reviewed   safety rounds completed  Taken 7/31/2022 2201 by Margot Rausch RN  Safety Measures:   safety rounds completed   safety plan reviewed  Goal: Optimized Coping Skills in Response to Life Stressors  Outcome: Ongoing, Progressing  Goal:  Develops/Participates in Therapeutic [place] to Support Successful Transition  Outcome: Ongoing, Progressing   Goal Outcome Evaluation:  Plan of Care Reviewed With: patient  Patient Agreement with Plan of Care: agrees     Progress: improving  Outcome Evaluation: Pt is calm and cooperative with staff; pt noted with difficulty staying and falling asleep; Denies SI and HI; rates anxiety 8 and depression 8.

## 2022-08-01 NOTE — PAYOR COMM NOTE
"Winter Ayers (67 y.o. Female)             Date of Birth   1955    Social Security Number       Address   [address] [place] [address]    Home Phone   [phone]    MRN   [id number]       Protestant   Amish    Marital Status   Single                            Admission Date   7/25/22    Admission Type   Emergency    Admitting Provider   Josue Bess MD    Attending Provider   Josue Bess MD    Department, Room/Bed   [facility], 1025/1S       Discharge Date       Discharge Disposition       Discharge Destination                               Attending Provider: Josue Bess MD    Allergies: Hydroxychloroquine Sulfate, Pregabalin, Sulfa Antibiotics, Ultram [Tramadol Hcl]    Isolation: None   Infection: None   Code Status: Prior   Advance Care Planning Activity    Ht: 152.4 cm (60\")   Wt: 72.3 kg (159 lb 6.4 oz)    Admission Cmt: None   Principal Problem: Major depressive disorder, recurrent episode, severe, with psychosis (HCC) [F33.3]                 Active Insurance as of 7/25/2022     Primary Coverage     Payor Plan Insurance Group Employer/Plan Group    HUMANA MEDICARE REPLACEMENT HUMANA MEDICARE REPLACEMENT [id number]     Payor Plan Address Payor Plan Phone Number Payor Plan Fax Number Effective Dates    [address] [address] [phone]  1/1/2018 - None Entered    [facility] [id number]       Subscriber Name Subscriber Birth Date Member ID       WINTER AYERS 1955 [id number]                 Emergency Contacts      (Rel.) Home Phone Work Phone Mobile Phone    Laureano Prakash (Friend) -- -- [phone]        ATT: [facility] BE[facility]AVIORAL HEALTH CONCURRENT REVIEW.    PLEASE ATTACH THIS CLINICAL UPDATE TO AUTH. # [id number].    RETURN FAX: [phone].  ESTIMATED LOS 1-2 ADDITIONAL DAYS PER THERAPY NOTE      FACILITY: [facility] (NPI [id number]) (TAX ID [id number])  ATTENDING MD: PRITI SHAVER (NPI [id number])  CURRENT " DIAGNOSIS:   Major depressive disorder, recurrent episode, severe, with psychosis (F33.3)  Post traumatic stress disorder (F43.10)     U.R. CONTACT:   MACI MILLER   PHONE [phone] EXT. [phone]  [phone]        CURRENT SCHEDULED PSYCH MEDS:  ARIPiprazole (ABILIFY) tablet 2 mg  Dose: 2 mg  Freq: Daily Route: PO  Start: 22 1100  mirtazapine (REMERON) tablet 30 mg  Dose: 30 mg  Freq: Nightly Route: PO  Start: 22 2100  sertraline (ZOLOFT) tablet 100 mg  Dose: 100 mg  Freq: Daily Route: PO  Start: 22 0901    RECENT PRN PSYCH MEDS UTILIZED:  hydrOXYzine (ATARAX) tablet 50 mg  Dose: 50 mg  Freq: Every 6 Hours PRN Route: PO  PRN Reason: Anxiety  Start: 22 191  REC'VD 2022 AT 2028 AND 2022 AT 0345AM        MD NOTE 2022:    Shelia Spencer MD    Physician   Psychiatry   Progress Notes       Signed   Date of Service:  22   Creation Time:  22              Signed            INPATIENT PSYCHIATRIC PROGRESS NOTE     Name:  Winter Ayers  :  1955  MRN:  [id number]  Visit Number:  [id number]  Length of stay:  6     SUBJECTIVE  CC/Focus of Exam: depression     INTERVAL HISTORY:  Patient is seen for follow up, she is sitting in the dayroom watching TV, states she feels the same having negative thoughts, claims her sleep is not good, rate her   Depression rating 8/10  Anxiety rating 8/10  Sleep: disruptive  Withdrawal sx:denies  Cravin/10     Per staff she spent most of the last evening in the day room watching TV and interacting with peers, apparently slept well last night, either way patient was told to ask for melatonin if she has difficulty sleeping, patient reported she did take Mirtazapine and hydroxyzine together that may have helped with sleep last night.     Review of Systems  AS PER INTERVAL HISTORY  OBJECTIVE     Temp:  [96.7 °F (35.9 °C)-97.9 °F (36.6 °C)] 96.7 °F (35.9 °C)  Heart Rate:  [] 106  Resp:  [18] 18  BP:  "(116-146)/(73-83) 137/75     MENTAL STATUS EXAM:  Appearance: Casually dressed, good hygeine.   Cooperation: Cooperative  Psychomotor: No psychomotor agitation/retardation, No EPS, No motor tics  Speech: normal rate, amount.  Mood: \" the same\"   Affect: congruent, appropriate  Thought Content: goal directed, no delusional material present  Thought process: linear, organized.  Suicidality: No SI  Homicidality: No HI  Perception: No AH/VH  Insight: fair   Judgment: fair         Lab Results (last 24 hours)      ** No results found for the last 24 hours. **                               Imaging Results (Last 24 Hours)      ** No results found for the last 24 hours. **                         ECG/EMG Results (most recent)      Procedure Component Value Units Date/Time     ECG 12 Lead [[id number]] Collected: 07/26/22 0157       Updated: 07/27/22 1334       QT Interval 442 ms         QTC Interval 426 ms       Narrative:       Test Reason : Baseline Cardiac Status  Blood Pressure :   */*   mmHG  Vent. Rate :  56 BPM     Atrial Rate :  56 BPM     P-R Int : 166 ms          QRS Dur : 100 ms      QT Int : 442 ms       P-R-T Axes :  63  29  30 degrees     QTc Int : 426 ms     Sinus bradycardia with sinus arrhythmia  T wave abnormality, consider anterior ischemia  Abnormal ECG  No previous ECGs available  Confirmed by Luther Leyva (2004) on 7/27/2022 1:34:32 PM     Referred By: WILLA           Confirmed By: Luther Leyva     Adult Transthoracic Echo Complete W/ Cont if Necessary Per Protocol [[id number]] Resulted: 07/27/22 2138       Updated: 07/27/22 2147       Target HR (85%) 132 bpm         Max. Pred. HR (100%) 155 bpm         LA ESV Index (BP) 16.8 ml/m2         Avg E/e' ratio 9.26       ACS 1.50 cm         Ao root diam 2.5 cm         Ao pk alonso 143.0 cm/sec         Ao V2 VTI 25.5 cm         EDV(cubed) 46.7 ml         EDV(MOD-sp4) 39.9 ml         EF(MOD-sp4) 57.1 %         ESV(cubed) 12.2 ml         ESV(MOD-sp4) 17.1 " ml         IVS/LVPW 0.92 cm         Lat Peak E' Joshua 6.9 cm/sec         LV mass(C)d 132.7 grams         LV V1 max PG 4.3 mmHg         LV V1 mean PG 2.00 mmHg         LV V1 max 104.0 cm/sec         LVPWd 1.20 cm         Med Peak E' Joshua 6.4 cm/sec         MV dec slope 250.0 cm/sec2         MV dec time 0.26 msec         MV V2 VTI 18.2 cm         PA acc time 0.07 sec         PA pr(Accel) 48.8 mmHg         PA V2 max 112.0 cm/sec         PI end-d joshua 122.5 cm/sec         RAP systole 10.0 mmHg         RVSP(TR) 18.3 mmHg         SI(MOD-sp4) 13.5 ml/m2         SV(MOD-sp4) 22.8 ml         TR max PG 8.3 mmHg         Ao max PG 8.2 mmHg         Ao mean PG 4.0 mmHg         FS 36.1 %         IVSd 1.10 cm         LA dimension (2D)  3.4 cm         LV V1 VTI 18.0 cm         LVIDd 3.6 cm         LVIDs 2.30 cm         MV E/A 0.69       MV max PG 3.1 mmHg         MV mean PG 2.00 mmHg         MV A max joshua 89.0 cm/sec         MV E max joshua 61.6 cm/sec         TR max joshua 144.0 cm/sec         LV Iglesias Vol (BSA corrected) 23.6 cm2         LV Sys Vol (BSA corrected) 10.1 cm2         TAPSE (>1.6) 2.11 cm       Narrative:       · Left ventricular wall thickness is consistent with borderline concentric   hypertrophy.  · Left ventricular ejection fraction appears to be 61 - 65%. Left   ventricular systolic function is normal.  · Left ventricular diastolic function is consistent with (grade I)   impaired relaxation.  · Estimated right ventricular systolic pressure from tricuspid   regurgitation is normal (<35 mmHg).  · A small echogenic shaddow seen at the aortic root, likely artifact,   consider additional imaging study if clinically indicated.                ALLERGIES: Hydroxychloroquine sulfate, Pregabalin, Sulfa antibiotics, and Ultram [tramadol hcl]        Current Facility-Administered Medications:   •  acetaminophen (TYLENOL) tablet 650 mg, 650 mg, Oral, Q6H PRN, Josh Bess MD, 650 mg at 07/28/22 2108  •  aluminum-magnesium  hydroxide-simethicone (MAALOX MAX) 400-400-40 MG/5ML suspension 15 mL, 15 mL, Oral, Q6H PRN, Josh Bess MD  •  ARIPiprazole (ABILIFY) tablet 2 mg, 2 mg, Oral, Daily, Josh Bess MD, 2 mg at 07/31/22 0807  •  benzonatate (TESSALON) capsule 100 mg, 100 mg, Oral, TID PRN, Josh Bess MD  •  benztropine (COGENTIN) tablet 2 mg, 2 mg, Oral, Once PRN **OR** benztropine (COGENTIN) injection 1 mg, 1 mg, Intramuscular, Once PRN, Josh Bess MD  •  Diclofenac Sodium (VOLTAREN) 1 % gel 2 g, 2 g, Topical, 4x Daily PRN, Josh Bess MD, 2 g at 07/30/22 1606  •  famotidine (PEPCID) tablet 20 mg, 20 mg, Oral, BID PRN, Josh Bess MD  •  hydrOXYzine (ATARAX) tablet 50 mg, 50 mg, Oral, Q6H PRN, Josh Bess MD, 50 mg at 07/30/22 2028  •  ibuprofen (ADVIL,MOTRIN) tablet 400 mg, 400 mg, Oral, Q6H PRN, Josh Bess MD, 400 mg at 07/31/22 0541  •  levothyroxine (SYNTHROID, LEVOTHROID) tablet 50 mcg, 50 mcg, Oral, Daily, Josh Bess MD, 50 mcg at 07/31/22 0807  •  loperamide (IMODIUM) capsule 2 mg, 2 mg, Oral, Q2H PRN, Josh Bess MD  •  magnesium hydroxide (MILK OF MAGNESIA) suspension 10 mL, 10 mL, Oral, Daily PRN, Josh Bess MD, 10 mL at 07/29/22 1519  •  melatonin tablet 5 mg, 5 mg, Oral, Nightly PRN, Shelia Spencer MD  •  mirtazapine (REMERON) tablet 30 mg, 30 mg, Oral, Nightly, Josh Bess MD, 30 mg at 07/30/22 2028  •  nicotine (NICODERM CQ) 14 MG/24HR patch 1 patch, 1 patch, Transdermal, Q24H, Josh Bess MD, 1 patch at 07/31/22 0807  •  ondansetron (ZOFRAN) tablet 4 mg, 4 mg, Oral, Q6H PRN, Josh Bess MD, 4 mg at 07/31/22 1257  •  pantoprazole (PROTONIX) EC tablet 40 mg, 40 mg, Oral, BID, Josh Bess MD, 40 mg at 07/31/22 0807  •  Pharmacy Consult, , Does not apply, Continuous PRN, Josh Bess MD  •   polyethylene glycol (MIRALAX) packet 17 g, 17 g, Oral, Daily PRN, Josh Bess MD, 17 g at 22 1151  •  Polyvinyl Alcohol-Povidone PF (HYPOTEARS) 1.4-0.6 % ophthalmic solution 1 drop, 1 drop, Both Eyes, Q3H PRN, Josh Bess MD, 1 drop at 22 1347  •  sertraline (ZOLOFT) tablet 100 mg, 100 mg, Oral, Daily, Josh Bess MD, 100 mg at 22 0807  •  sodium chloride nasal spray 2 spray, 2 spray, Each Nare, PRN, Jsoh Bess MD, 2 spray at 22 1257     Reviewed chart, notes, vitals, labs and EKG personally     ASSESSMENT & PLAN:     Major depressive disorder, recurrent episode, severe, with psychosis (HCC)     Abilify, Sertraline,Mirtazapine     Post traumatic stress disorder (PTSD)  Sertraline      Hiatal hernia with GERD  Pantoprazole     Lumbar back pain  Ibuprofen     Cervical back pain with evidence of disc disease  Ibuprofen         Hypothyroidism  Levothyroxine         Special precautions: Special Precautions Level 3 (q15 min checks)      Behavioral Health Treatment Plan and Problem List: I have reviewed and approved the Behavioral Health Treatment Plan and Problem list.  The patient has had a chance to review and agrees with the treatment plan.      Clinician:  Shelia Spencer MD  22  13:17 EDT                     MD NOTE 2022:  Bryce Allison MD    Physician   Psychiatry   Progress Notes       Signed   Date of Service:  22 1323   Creation Time:  22 1323              Signed              INPATIENT PSYCHIATRIC PROGRESS NOTE     Name:  Winter Ayers  :  1955  MRN:  [id number]  Visit Number:  [id number]  Length of stay:  7     SUBJECTIVE     CC/Focus of Exam: depressioni     INTERVAL HISTORY:  The patient reports she is feeling better. States she came to the hospital because she was feeling depressed and suicidal and felt alone after she was scammed by a person who pretended to love her and sold her car  "and sent him the money and she later found out it was a woman in [place]. She denies having any suicidal thoughts at this time.   Depression rating 8/10  Anxiety rating 8/10  Sleep: good  Withdrawal sx: denies  Cravin/10     Review of Systems   Respiratory: Negative.    Cardiovascular: Negative.    Gastrointestinal: Negative.          OBJECTIVE     Temp:  [96.8 °F (36 °C)] 96.8 °F (36 °C)  Heart Rate:  [82-90] 90  Resp:  [18] 18  BP: (151-158)/(84-90) 155/87     MENTAL STATUS EXAM:  Appearance:Casually dressed, good hygeine.   Cooperation:Cooperative  Psychomotor: No psychomotor agitation/retardation, No EPS, No motor tics  Speech-normal rate, amount.  Mood \"depressed and anxious\"   Affect-congruent, appropriate, stable  Thought Content-goal directed, no delusional material present  Thought process-linear, organized.  Suicidality: No SI  Homicidality: No HI  Perception: No AH/VH  Insight-fair   Judgement-fair         Lab Results (last 24 hours)      ** No results found for the last 24 hours. **                               Imaging Results (Last 24 Hours)      ** No results found for the last 24 hours. **                         ECG/EMG Results (most recent)      Procedure Component Value Units Date/Time     ECG 12 Lead [[id number]] Collected: 22 0157       Updated: 22 1334       QT Interval 442 ms         QTC Interval 426 ms       Narrative:       Test Reason : Baseline Cardiac Status  Blood Pressure :   */*   mmHG  Vent. Rate :  56 BPM     Atrial Rate :  56 BPM     P-R Int : 166 ms          QRS Dur : 100 ms      QT Int : 442 ms       P-R-T Axes :  63  29  30 degrees     QTc Int : 426 ms     Sinus bradycardia with sinus arrhythmia  T wave abnormality, consider anterior ischemia  Abnormal ECG  No previous ECGs available  Confirmed by Luther Leyva () on 2022 1:34:32 PM     Referred By: WLILA           Confirmed By: Luther Leyva     Adult Transthoracic Echo Complete W/ Cont if " Necessary Per Protocol [[id number]] Resulted: 07/27/22 2138       Updated: 07/27/22 2147       Target HR (85%) 132 bpm         Max. Pred. HR (100%) 155 bpm         LA ESV Index (BP) 16.8 ml/m2         Avg E/e' ratio 9.26       ACS 1.50 cm         Ao root diam 2.5 cm         Ao pk joshua 143.0 cm/sec         Ao V2 VTI 25.5 cm         EDV(cubed) 46.7 ml         EDV(MOD-sp4) 39.9 ml         EF(MOD-sp4) 57.1 %         ESV(cubed) 12.2 ml         ESV(MOD-sp4) 17.1 ml         IVS/LVPW 0.92 cm         Lat Peak E' Joshua 6.9 cm/sec         LV mass(C)d 132.7 grams         LV V1 max PG 4.3 mmHg         LV V1 mean PG 2.00 mmHg         LV V1 max 104.0 cm/sec         LVPWd 1.20 cm         Med Peak E' Joshua 6.4 cm/sec         MV dec slope 250.0 cm/sec2         MV dec time 0.26 msec         MV V2 VTI 18.2 cm         PA acc time 0.07 sec         PA pr(Accel) 48.8 mmHg         PA V2 max 112.0 cm/sec         PI end-d joshua 122.5 cm/sec         RAP systole 10.0 mmHg         RVSP(TR) 18.3 mmHg         SI(MOD-sp4) 13.5 ml/m2         SV(MOD-sp4) 22.8 ml         TR max PG 8.3 mmHg         Ao max PG 8.2 mmHg         Ao mean PG 4.0 mmHg         FS 36.1 %         IVSd 1.10 cm         LA dimension (2D)  3.4 cm         LV V1 VTI 18.0 cm         LVIDd 3.6 cm         LVIDs 2.30 cm         MV E/A 0.69       MV max PG 3.1 mmHg         MV mean PG 2.00 mmHg         MV A max joshua 89.0 cm/sec         MV E max joshua 61.6 cm/sec         TR max joshua 144.0 cm/sec         LV Iglesias Vol (BSA corrected) 23.6 cm2         LV Sys Vol (BSA corrected) 10.1 cm2         TAPSE (>1.6) 2.11 cm       Narrative:       · Left ventricular wall thickness is consistent with borderline concentric   hypertrophy.  · Left ventricular ejection fraction appears to be 61 - 65%. Left   ventricular systolic function is normal.  · Left ventricular diastolic function is consistent with (grade I)   impaired relaxation.  · Estimated right ventricular systolic pressure from tricuspid   regurgitation is  normal (<35 mmHg).  · A small echogenic shaddow seen at the aortic root, likely artifact,   consider additional imaging study if clinically indicated.                ALLERGIES: Hydroxychloroquine sulfate, Pregabalin, Sulfa antibiotics, and Ultram [tramadol hcl]        Current Facility-Administered Medications:   •  acetaminophen (TYLENOL) tablet 650 mg, 650 mg, Oral, Q6H PRN, Josh Bess MD, 650 mg at 07/28/22 2108  •  aluminum-magnesium hydroxide-simethicone (MAALOX MAX) 400-400-40 MG/5ML suspension 15 mL, 15 mL, Oral, Q6H PRN, Josh Bess MD  •  ARIPiprazole (ABILIFY) tablet 2 mg, 2 mg, Oral, Daily, Josh Bess MD, 2 mg at 08/01/22 0823  •  benzonatate (TESSALON) capsule 100 mg, 100 mg, Oral, TID PRN, Josh Bess MD  •  benztropine (COGENTIN) tablet 2 mg, 2 mg, Oral, Once PRN **OR** benztropine (COGENTIN) injection 1 mg, 1 mg, Intramuscular, Once PRN, Josh Bess MD  •  Diclofenac Sodium (VOLTAREN) 1 % gel 2 g, 2 g, Topical, 4x Daily PRN, Josh Bess MD, 2 g at 08/01/22 1228  •  famotidine (PEPCID) tablet 20 mg, 20 mg, Oral, BID PRN, Josh Bess MD  •  hydrOXYzine (ATARAX) tablet 50 mg, 50 mg, Oral, Q6H PRN, Josh Bess MD, 50 mg at 08/01/22 0345  •  ibuprofen (ADVIL,MOTRIN) tablet 400 mg, 400 mg, Oral, Q6H PRN, Josh Bess MD, 400 mg at 07/31/22 2247  •  levothyroxine (SYNTHROID, LEVOTHROID) tablet 50 mcg, 50 mcg, Oral, Daily, Josh Bess MD, 50 mcg at 08/01/22 0823  •  loperamide (IMODIUM) capsule 2 mg, 2 mg, Oral, Q2H PRN, Josh Bess MD  •  magnesium hydroxide (MILK OF MAGNESIA) suspension 10 mL, 10 mL, Oral, Daily PRN, Josh Bess MD, 10 mL at 08/01/22 1007  •  melatonin tablet 5 mg, 5 mg, Oral, Nightly PRN, Shelia Spencer MD  •  mirtazapine (REMERON) tablet 30 mg, 30 mg, Oral, Nightly, Josh Bess MD, 30 mg at 07/31/22  2245  •  nicotine (NICODERM CQ) 14 MG/24HR patch 1 patch, 1 patch, Transdermal, Q24H, Josh Bess MD, 1 patch at 08/01/22 0823  •  ondansetron (ZOFRAN) tablet 4 mg, 4 mg, Oral, Q6H PRN, Josh Bess MD, 4 mg at 07/31/22 1257  •  pantoprazole (PROTONIX) EC tablet 40 mg, 40 mg, Oral, BID, Josh Bess MD, 40 mg at 08/01/22 0823  •  Pharmacy Consult, , Does not apply, Continuous PRN, Josh Bess MD  •  polyethylene glycol (MIRALAX) packet 17 g, 17 g, Oral, Daily PRN, Josh Bess MD, 17 g at 08/01/22 1228  •  Polyvinyl Alcohol-Povidone PF (HYPOTEARS) 1.4-0.6 % ophthalmic solution 1 drop, 1 drop, Both Eyes, Q3H PRN, Josh Bess MD, 1 drop at 08/01/22 1228  •  sertraline (ZOLOFT) tablet 100 mg, 100 mg, Oral, Daily, Josh Bess MD, 100 mg at 08/01/22 0823  •  sodium chloride nasal spray 2 spray, 2 spray, Each Nare, PRN, Josh Bess MD, 2 spray at 08/01/22 1228     ASSESSMENT & PLAN:     Major depressive disorder, recurrent episode, severe, with psychosis (HCC)  - Abilify, Sertraline, Mirtazapine     Post traumatic stress disorder (PTSD)  - Sertraline      Hiatal hernia with GERD  - Pantoprazole     Lumbar back pain  - Ibuprofen     Cervical back pain with evidence of disc disease  - Ibuprofen       Hypothyroidism  - Levothyroxine      Special precautions: Special Precautions Level 3 (q15 min checks) .     Behavioral Health Treatment Plan and Problem List: I have reviewed and approved the Behavioral Health Treatment Plan and Problem list.  The patient has had a chance to review and agrees with the treatment plan.      Clinician:  Bryce Allison MD  08/01/22  13:23 EDT                PER NURSING NOTES 08/01/2022: pt noted with difficulty staying and falling asleep,ANXIETY/DEPERSSION 7, INSIGHT/JUDGEMENT NOT APPROPRIATE, HOPELESSNESS, HELPLESSNESS, WORTHLESSNESS:  Behavioral    General Appearance WDL    WDL    General  Appearance WDL     Behavior WDL    WDL    Behavior WDL     General Appearance WDL    WDL    General Appearance WDL     Emotion/Mood/Affect WDL    X    Emotion/Mood/Affect WDL     Affect    affect consis...    Affect     Emotion/Mood    calm;hopeless    Emotion/Mood     Patient rated anxiety level    7    Patient rated anxiety level     Patient rated depression level    7    Patient rated depression level     Speech WDL    WDL    Speech WDL     Perceptual State WDL    WDL    Perceptual State WDL     Thought Process WDL    X    Thought Process WDL     Delusions    no delusions    Delusions     Judgment and Insight    insight not a...    Judgment and Insight     Thought Content    hopelessness;...    Thought Content     Thought Process    relevant;logi...    Thought Process     Intellectual Performance WDL    WDL            THERAPY NOTE 08/01/2022:    Christen Davenport LCSW    Counselor   Psychiatry   Plan of Care       Signed   Date of Service:  08/01/22 1050   Creation Time:  08/01/22 1050              Signed              Show:Clear all  [x]Manual[x]Template[]Copied    Added by:  [x]Christen Davenport LCSW      []Trever for details       Problem: Adult Behavioral Health Plan of Care  Goal: Patient-Specific Goal (Individualization)  Outcome: Ongoing, Progressing  Flowsheets  Taken 7/26/2022 1624 by Christen Davenport LCSW  Patient-Specific Goals (Include Timeframe): Winter to deny suicidal ideation prior to discharge, identify 1-2 healthy coping skills during her 3-7 day hospital stay, engage in safe disposition planning prior to discharge  Individualized Care Needs: Medication management, individual and group therapy  Taken 7/26/2022 1619 by Christen Davenport LCSW  Patient Personal Strengths: motivated for treatment  Patient Vulnerabilities:  • adverse childhood experience(s)  • family/relationship conflict  • traumatic event  Taken 7/25/2022 2019 by Jessie Solis RN  Anxieties, Fears or  Concerns: None verbalized     Problem: Adult Behavioral Health Plan of Care  Goal: Optimized Coping Skills in Response to Life Stressors  Outcome: Ongoing, Progressing  Flowsheets (Taken 8/1/2022 1049)  Optimized Coping Skills in Response to Life Stressors: making progress toward outcome     Problem: Adult Behavioral Health Plan of Care  Goal: Optimized Coping Skills in Response to Life Stressors  Intervention: Promote Effective Coping Strategies  Flowsheets (Taken 8/1/2022 1049)  Supportive Measures:  • active listening utilized  • positive reinforcement provided  • self-responsibility promoted  • counseling provided  • decision-making supported  • problem-solving facilitated  • verbalization of feelings encouraged  • goal-setting facilitated  • self-care encouraged  • self-reflection promoted     Problem: Adult Behavioral Health Plan of Care  Goal: Develops/Participates in Therapeutic [place] to Support Successful Transition  Outcome: Ongoing, Progressing  Flowsheets (Taken 8/1/2022 1049)  Develops/Participates in Therapeutic [place] to Support Successful Transition: making progress toward outcome     Problem: Adult Behavioral Health Plan of Care  Goal: Develops/Participates in Therapeutic [place] to Support Successful Transition  Intervention: Foster Therapeutic [place]  Flowsheets (Taken 8/1/2022 1049)  Trust Relationship/Rapport:  • care explained  • reassurance provided  • choices provided  • thoughts/feelings acknowledged  • emotional support provided  • empathic listening provided  • questions answered  • questions encouraged     Problem: Adult Behavioral Health Plan of Care  Goal: Develops/Participates in Therapeutic [place] to Support Successful Transition  Intervention: Mutually Develop Transition Plan  Flowsheets  Taken 8/1/2022 1049  Transition Support:  • community resources reviewed  • crisis management plan promoted  • crisis management plan verbalized  • follow-up care coordinated  • follow-up  care discussed  Taken 8/1/2022 1048  Transportation Anticipated: family or friend will provide  Transportation Concerns: no car  Current Discharge Risk: psychiatric illness  Concerns to be Addressed:  • coping/stress  • mental health  Readmission Within the Last 30 Days: no previous admission in last 30 days  Patient/Family Anticipated Services at Transition:  • outpatient care  • mental health services  Patient/Family Anticipates Transition to: home  Offered/Gave Vendor List: no  Data:  Therapist discussed patient with nursing staff and met with patient this date to further discuss patient progress, review healthy coping and safe disposition.        Clinical Maneuvering/Intervention:     Therapist assisted patient in processing above session content; acknowledged and normalized patient's thoughts, feelings and concerns.  Encouraged patient to discuss/vent feelings, frustrations, and fears concerning their ongoing issues and validated patients feelings.  Discussed the importance of healthy coping and reviewed healthy coping skills such as distraction, thought reframing/redirecting, grounding, mindfulness, etc.  Reviewed safe disposition with patient.     Assessment:  Patient denies suicidal ideation/homicidal ideation.  Patient reports decrease in depression and anxiety today.  Patient states she will feel safe to return home in the next day or two.  She reports that she wants to get home to check on her cat.  She reports that no major issues have been found with all the testing she has had done.  She reports that she has plans to try some of the healthy coping skills she has learned here including trying to journal.  She discussed that her friend will give her a ride when she is discharged.     She reports she will be safe to return home and agrees to return to the hospital if she has suicidal ideation in the future.     Plan:  Patient will continue hospitalization/medication management. Patient will return home  upon stabilization.  Patient will engage in aftercare with [facility].

## 2022-08-01 NOTE — PROGRESS NOTES
"INPATIENT PSYCHIATRIC PROGRESS NOTE    Name:  Winter Ayers  :  1955  MRN:  [id number]  Visit Number:  [id number]  Length of stay:  7    SUBJECTIVE    CC/Focus of Exam: depressioni    INTERVAL HISTORY:  The patient reports she is feeling better. States she came to the hospital because she was feeling depressed and suicidal and felt alone after she was scammed by a person who pretended to love her and sold her car and sent him the money and she later found out it was a woman in [place]. She denies having any suicidal thoughts at this time.   Depression rating 8/10  Anxiety rating 8/10  Sleep: good  Withdrawal sx: denies  Cravin/10    Review of Systems   Respiratory: Negative.    Cardiovascular: Negative.    Gastrointestinal: Negative.        OBJECTIVE    Temp:  [96.8 °F (36 °C)] 96.8 °F (36 °C)  Heart Rate:  [82-90] 90  Resp:  [18] 18  BP: (151-158)/(84-90) 155/87    MENTAL STATUS EXAM:  Appearance:Casually dressed, good hygeine.   Cooperation:Cooperative  Psychomotor: No psychomotor agitation/retardation, No EPS, No motor tics  Speech-normal rate, amount.  Mood \"depressed and anxious\"   Affect-congruent, appropriate, stable  Thought Content-goal directed, no delusional material present  Thought process-linear, organized.  Suicidality: No SI  Homicidality: No HI  Perception: No AH/VH  Insight-fair   Judgement-fair    Lab Results (last 24 hours)     ** No results found for the last 24 hours. **             Imaging Results (Last 24 Hours)     ** No results found for the last 24 hours. **             ECG/EMG Results (most recent)     Procedure Component Value Units Date/Time    ECG 12 Lead [[id number]] Collected: 22 0157     Updated: 22 1334     QT Interval 442 ms      QTC Interval 426 ms     Narrative:      Test Reason : Baseline Cardiac Status  Blood Pressure :   */*   mmHG  Vent. Rate :  56 BPM     Atrial Rate :  56 BPM     P-R Int : 166 ms          QRS Dur : 100 ms      QT Int : 442 ms  "      P-R-T Axes :  63  29  30 degrees     QTc Int : 426 ms    Sinus bradycardia with sinus arrhythmia  T wave abnormality, consider anterior ischemia  Abnormal ECG  No previous ECGs available  Confirmed by Luther Leyva ([id number]) on 7/27/2022 1:34:32 PM    Referred By: WILLA           Confirmed By: Luther Leyva    Adult Transthoracic Echo Complete W/ Cont if Necessary Per Protocol [[id number]] Resulted: 07/27/22 2138     Updated: 07/27/22 2147     Target HR (85%) 132 bpm      Max. Pred. HR (100%) 155 bpm      LA ESV Index (BP) 16.8 ml/m2      Avg E/e' ratio 9.26     ACS 1.50 cm      Ao root diam 2.5 cm      Ao pk joshua 143.0 cm/sec      Ao V2 VTI 25.5 cm      EDV(cubed) 46.7 ml      EDV(MOD-sp4) 39.9 ml      EF(MOD-sp4) 57.1 %      ESV(cubed) 12.2 ml      ESV(MOD-sp4) 17.1 ml      IVS/LVPW 0.92 cm      Lat Peak E' Joshua 6.9 cm/sec      LV mass(C)d 132.7 grams      LV V1 max PG 4.3 mmHg      LV V1 mean PG 2.00 mmHg      LV V1 max 104.0 cm/sec      LVPWd 1.20 cm      Med Peak E' Joshua 6.4 cm/sec      MV dec slope 250.0 cm/sec2      MV dec time 0.26 msec      MV V2 VTI 18.2 cm      PA acc time 0.07 sec      PA pr(Accel) 48.8 mmHg      PA V2 max 112.0 cm/sec      PI end-d joshua 122.5 cm/sec      RAP systole 10.0 mmHg      RVSP(TR) 18.3 mmHg      SI(MOD-sp4) 13.5 ml/m2      SV(MOD-sp4) 22.8 ml      TR max PG 8.3 mmHg      Ao max PG 8.2 mmHg      Ao mean PG 4.0 mmHg      FS 36.1 %      IVSd 1.10 cm      LA dimension (2D)  3.4 cm      LV V1 VTI 18.0 cm      LVIDd 3.6 cm      LVIDs 2.30 cm      MV E/A 0.69     MV max PG 3.1 mmHg      MV mean PG 2.00 mmHg      MV A max joshua 89.0 cm/sec      MV E max joshua 61.6 cm/sec      TR max joshua 144.0 cm/sec      LV Iglesias Vol (BSA corrected) 23.6 cm2      LV Sys Vol (BSA corrected) 10.1 cm2      TAPSE (>1.6) 2.11 cm     Narrative:      · Left ventricular wall thickness is consistent with borderline concentric   hypertrophy.  · Left ventricular ejection fraction appears to be 61 - 65%. Left    ventricular systolic function is normal.  · Left ventricular diastolic function is consistent with (grade I)   impaired relaxation.  · Estimated right ventricular systolic pressure from tricuspid   regurgitation is normal (<35 mmHg).  · A small echogenic shaddow seen at the aortic root, likely artifact,   consider additional imaging study if clinically indicated.              ALLERGIES: Hydroxychloroquine sulfate, Pregabalin, Sulfa antibiotics, and Ultram [tramadol hcl]      Current Facility-Administered Medications:   •  acetaminophen (TYLENOL) tablet 650 mg, 650 mg, Oral, Q6H PRN, Josh Bess MD, 650 mg at 07/28/22 2108  •  aluminum-magnesium hydroxide-simethicone (MAALOX MAX) 400-400-40 MG/5ML suspension 15 mL, 15 mL, Oral, Q6H PRN, Josh Bess MD  •  ARIPiprazole (ABILIFY) tablet 2 mg, 2 mg, Oral, Daily, Josh Bess MD, 2 mg at 08/01/22 0823  •  benzonatate (TESSALON) capsule 100 mg, 100 mg, Oral, TID PRN, Josh Bess MD  •  benztropine (COGENTIN) tablet 2 mg, 2 mg, Oral, Once PRN **OR** benztropine (COGENTIN) injection 1 mg, 1 mg, Intramuscular, Once PRN, Josh Bess MD  •  Diclofenac Sodium (VOLTAREN) 1 % gel 2 g, 2 g, Topical, 4x Daily PRN, Josh Bess MD, 2 g at 08/01/22 1228  •  famotidine (PEPCID) tablet 20 mg, 20 mg, Oral, BID PRN, Josh Bess MD  •  hydrOXYzine (ATARAX) tablet 50 mg, 50 mg, Oral, Q6H PRN, Josh Bess MD, 50 mg at 08/01/22 0345  •  ibuprofen (ADVIL,MOTRIN) tablet 400 mg, 400 mg, Oral, Q6H PRN, Josh Bess MD, 400 mg at 07/31/22 2247  •  levothyroxine (SYNTHROID, LEVOTHROID) tablet 50 mcg, 50 mcg, Oral, Daily, Josh Bess MD, 50 mcg at 08/01/22 0823  •  loperamide (IMODIUM) capsule 2 mg, 2 mg, Oral, Q2H PRN, Josh Bess MD  •  magnesium hydroxide (MILK OF MAGNESIA) suspension 10 mL, 10 mL, Oral, Daily PRN, Josh Bess MD,  10 mL at 08/01/22 1007  •  melatonin tablet 5 mg, 5 mg, Oral, Nightly PRN, Shelia Spencer MD  •  mirtazapine (REMERON) tablet 30 mg, 30 mg, Oral, Nightly, Josh Bess MD, 30 mg at 07/31/22 2245  •  nicotine (NICODERM CQ) 14 MG/24HR patch 1 patch, 1 patch, Transdermal, Q24H, Josh Bess MD, 1 patch at 08/01/22 0823  •  ondansetron (ZOFRAN) tablet 4 mg, 4 mg, Oral, Q6H PRN, Josh Bess MD, 4 mg at 07/31/22 1257  •  pantoprazole (PROTONIX) EC tablet 40 mg, 40 mg, Oral, BID, Josh Bess MD, 40 mg at 08/01/22 0823  •  Pharmacy Consult, , Does not apply, Continuous PRN, Josh Bess MD  •  polyethylene glycol (MIRALAX) packet 17 g, 17 g, Oral, Daily PRN, Josh Bess MD, 17 g at 08/01/22 1228  •  Polyvinyl Alcohol-Povidone PF (HYPOTEARS) 1.4-0.6 % ophthalmic solution 1 drop, 1 drop, Both Eyes, Q3H PRN, Josh Bess MD, 1 drop at 08/01/22 1228  •  sertraline (ZOLOFT) tablet 100 mg, 100 mg, Oral, Daily, Josh Bess MD, 100 mg at 08/01/22 0823  •  sodium chloride nasal spray 2 spray, 2 spray, Each Nare, PRN, Josh Bess MD, 2 spray at 08/01/22 1228    ASSESSMENT & PLAN:    Major depressive disorder, recurrent episode, severe, with psychosis (HCC)  - Abilify, Sertraline, Mirtazapine     Post traumatic stress disorder (PTSD)  - Sertraline      Hiatal hernia with GERD  - Pantoprazole     Lumbar back pain  - Ibuprofen     Cervical back pain with evidence of disc disease  - Ibuprofen       Hypothyroidism  - Levothyroxine     Special precautions: Special Precautions Level 3 (q15 min checks) .    Behavioral Health Treatment Plan and Problem List: I have reviewed and approved the Behavioral Health Treatment Plan and Problem list.  The patient has had a chance to review and agrees with the treatment plan.     Clinician:  Bryce Allison MD  08/01/22  13:23 EDT

## 2022-08-01 NOTE — DISCHARGE INSTR - APPOINTMENTS
[facility]  [address] [place] [id number]  [phone]      You can present as a walk-in for initial appointment any day Monday through Friday between 9:00 am and 2:00 pm    Wednesday August 3rd at 12:00 pm

## 2022-08-01 NOTE — PLAN OF CARE
Goal Outcome Evaluation:  Plan of Care Reviewed With: patient  Patient Agreement with Plan of Care: agrees     Progress: improving  Outcome Evaluation: Pt is calm and cooperative with staff and her peers,she has spent most of her day in the day room watching TV. Denies SI and HI, rates anxiety 7, depression 7.

## 2022-08-01 NOTE — PLAN OF CARE
Problem: Adult Behavioral Health Plan of Care  Goal: Patient-Specific Goal (Individualization)  Outcome: Ongoing, Progressing  Flowsheets  Taken 7/26/2022 1624 by Christen Davenport LCSW  Patient-Specific Goals (Include Timeframe): Winter to deny suicidal ideation prior to discharge, identify 1-2 healthy coping skills during her 3-7 day hospital stay, engage in safe disposition planning prior to discharge  Individualized Care Needs: Medication management, individual and group therapy  Taken 7/26/2022 1619 by Christen Davenport LCSW  Patient Personal Strengths: motivated for treatment  Patient Vulnerabilities:   adverse childhood experience(s)   family/relationship conflict   traumatic event  Taken 7/25/2022 2019 by Jessie Solis RN  Anxieties, Fears or Concerns: None verbalized     Problem: Adult Behavioral Health Plan of Care  Goal: Optimized Coping Skills in Response to Life Stressors  Outcome: Ongoing, Progressing  Flowsheets (Taken 8/1/2022 1049)  Optimized Coping Skills in Response to Life Stressors: making progress toward outcome     Problem: Adult Behavioral Health Plan of Care  Goal: Optimized Coping Skills in Response to Life Stressors  Intervention: Promote Effective Coping Strategies  Flowsheets (Taken 8/1/2022 1049)  Supportive Measures:   active listening utilized   positive reinforcement provided   self-responsibility promoted   counseling provided   decision-making supported   problem-solving facilitated   verbalization of feelings encouraged   goal-setting facilitated   self-care encouraged   self-reflection promoted     Problem: Adult Behavioral Health Plan of Care  Goal: Develops/Participates in Therapeutic [place]ristow to Support Successful Transition  Outcome: Ongoing, Progressing  Flowsheets (Taken 8/1/2022 1049)  Develops/Participates in Therapeutic [place] to Support Successful Transition: making progress toward outcome     Problem: Adult Behavioral Health Plan of Care  Goal:  Develops/Participates in Therapeutic [place] to Support Successful Transition  Intervention: Foster Therapeutic [place]  Flowsheets (Taken 8/1/2022 1049)  Trust Relationship/Rapport:   care explained   reassurance provided   choices provided   thoughts/feelings acknowledged   emotional support provided   empathic listening provided   questions answered   questions encouraged     Problem: Adult Behavioral Health Plan of Care  Goal: Develops/Participates in Therapeutic [place] to Support Successful Transition  Intervention: Mutually Develop Transition Plan  Flowsheets  Taken 8/1/2022 1049  Transition Support:   community resources reviewed   crisis management plan promoted   crisis management plan verbalized   follow-up care coordinated   follow-up care discussed  Taken 8/1/2022 1048  Transportation Anticipated: family or friend will provide  Transportation Concerns: no car  Current Discharge Risk: psychiatric illness  Concerns to be Addressed:   coping/stress   mental health  Readmission Within the Last 30 Days: no previous admission in last 30 days  Patient/Family Anticipated Services at Transition:   outpatient care   mental health services  Patient/Family Anticipates Transition to: home  Offered/Gave Vendor List: no  Data:  Therapist discussed patient with nursing staff and met with patient this date to further discuss patient progress, review healthy coping and safe disposition.      Clinical Maneuvering/Intervention:    Therapist assisted patient in processing above session content; acknowledged and normalized patient's thoughts, feelings and concerns.  Encouraged patient to discuss/vent feelings, frustrations, and fears concerning their ongoing issues and validated patients feelings.  Discussed the importance of healthy coping and reviewed healthy coping skills such as distraction, thought reframing/redirecting, grounding, mindfulness, etc.  Reviewed safe disposition with patient.    Assessment:  Patient  denies suicidal ideation/homicidal ideation.  Patient reports decrease in depression and anxiety today.  Patient states she will feel safe to return home in the next day or two.  She reports that she wants to get home to check on her cat.  She reports that no major issues have been found with all the testing she has had done.  She reports that she has plans to try some of the healthy coping skills she has learned here including trying to journal.  She discussed that her friend will give her a ride when she is discharged.   She reports she will be safe to return home and agrees to return to the hospital if she has suicidal ideation in the future.    Plan:  Patient will continue hospitalization/medication management. Patient will return home upon stabilization.  Patient will engage in aftercare with [facility]ong.

## 2022-08-02 VITALS
BODY MASS INDEX: 31.29 KG/M2 | WEIGHT: 159.4 LBS | HEART RATE: 94 BPM | TEMPERATURE: 97.1 F | HEIGHT: 60 IN | OXYGEN SATURATION: 97 % | SYSTOLIC BLOOD PRESSURE: 119 MMHG | DIASTOLIC BLOOD PRESSURE: 72 MMHG | RESPIRATION RATE: 16 BRPM

## 2022-08-02 LAB
GLUCOSE BLDC GLUCOMTR-MCNC: 105 MG/DL (ref 70–130)
GLUCOSE BLDC GLUCOMTR-MCNC: 52 MG/DL (ref 70–130)

## 2022-08-02 PROCEDURE — 82962 GLUCOSE BLOOD TEST: CPT

## 2022-08-02 PROCEDURE — 99239 HOSP IP/OBS DSCHRG MGMT >30: CPT | Performed by: PSYCHIATRY & NEUROLOGY

## 2022-08-02 RX ORDER — MIRTAZAPINE 30 MG/1
30 TABLET, FILM COATED ORAL NIGHTLY
Qty: 30 TABLET | Refills: 0 | Status: SHIPPED | OUTPATIENT
Start: 2022-08-02

## 2022-08-02 RX ORDER — SERTRALINE HYDROCHLORIDE 100 MG/1
100 TABLET, FILM COATED ORAL DAILY
Qty: 30 TABLET | Refills: 0 | Status: SHIPPED | OUTPATIENT
Start: 2022-08-03

## 2022-08-02 RX ORDER — ARIPIPRAZOLE 2 MG/1
2 TABLET ORAL DAILY
Qty: 30 TABLET | Refills: 0 | Status: SHIPPED | OUTPATIENT
Start: 2022-08-03

## 2022-08-02 RX ADMIN — POLYETHYLENE GLYCOL (3350) 17 G: 17 POWDER, FOR SOLUTION ORAL at 08:25

## 2022-08-02 RX ADMIN — SERTRALINE 100 MG: 50 TABLET, FILM COATED ORAL at 08:25

## 2022-08-02 RX ADMIN — ARIPIPRAZOLE 2 MG: 2 TABLET ORAL at 08:25

## 2022-08-02 RX ADMIN — LEVOTHYROXINE SODIUM 50 MCG: 50 TABLET ORAL at 08:25

## 2022-08-02 RX ADMIN — IBUPROFEN 400 MG: 400 TABLET, FILM COATED ORAL at 13:47

## 2022-08-02 RX ADMIN — NICOTINE 1 PATCH: 14 PATCH, EXTENDED RELEASE TRANSDERMAL at 08:25

## 2022-08-02 RX ADMIN — PANTOPRAZOLE SODIUM 40 MG: 40 TABLET, DELAYED RELEASE ORAL at 08:25

## 2022-08-02 NOTE — DISCHARGE SUMMARY
":  1955  MRN:  [id number]  Visit Number:  [id number]      Date of Admission:2022   Date of Discharge:  2022    Discharge Diagnosis:  Principal Problem:    Major depressive disorder, recurrent episode, severe, with psychosis (HCC)  Active Problems:    Post traumatic stress disorder (PTSD)    Hiatal hernia with GERD    Lumbar back pain    Cervical back pain with evidence of disc disease    Chest pain    Hypothyroidism        Admission Diagnosis:  MDD (major depressive disorder) [F32.9]     HPI  Winter Ayers is a 67 y.o. female who was admitted on 2022 with complaints of depression with suicidal attempt.  For details please see H&P dated 22.    Hospital Course  Patient is a 67 y.o. female presented with depression and suicide attempt. The patient was admitted to the [facility] senior unit for safety, further evaluation and treatment.  The patient was started on sertraline and augmented with aripiprazole and mirtazapine was added. The patient was able to take the medications without any adverse effects.   The patient was also able to take part in individual and group counseling sessions and work on appropriate coping skills. She was encouraged and she appeared receptive to adjust her expectations to a more realistic level.   The patient made steady improvement in her mood and expressed feeling more positive and hopeful about future. Sleep and appetite were improved.  The day of discharge the patient was calm, cooperative and pleasant. Mood was reported to be good, and denied SI/HI/AVH. Also reported no medication side effects.  .      Mental Status Exam upon discharge:   Mood \"good\"   Affect-congruent, appropriate, stable  Thought Content-goal directed, no delusional material present  Thought process-linear, organized.  Suicidality: No SI  Homicidality: No HI  Perception: No AH/VH    Procedures Performed         Consults:   Consults     Date and Time Order Name Status Description "    7/26/2022 11:42 AM Inpatient Cardiology Consult Completed           Pertinent Test Results:   Admission on 07/25/2022   Component Date Value Ref Range Status   • QT Interval 07/26/2022 442  ms Final   • QTC Interval 07/26/2022 426  ms Final   • Hemoglobin A1C 07/26/2022 4.70 (A) 4.80 - 5.60 % Final   • Total Cholesterol 07/26/2022 172  0 - 200 mg/dL Final   • Triglycerides 07/26/2022 55  0 - 150 mg/dL Final   • HDL Cholesterol 07/26/2022 67 (A) 40 - 60 mg/dL Final   • LDL Cholesterol  07/26/2022 94  0 - 100 mg/dL Final   • VLDL Cholesterol 07/26/2022 11  5 - 40 mg/dL Final   • LDL/HDL Ratio 07/26/2022 1.40   Final   • TSH 07/26/2022 1.400  0.270 - 4.200 uIU/mL Final   • Free T4 07/26/2022 1.04  0.93 - 1.70 ng/dL Final   • Color, UA 07/26/2022 Yellow  Yellow, Straw Final   • Appearance, UA 07/26/2022 Clear  Clear Final   • pH, UA 07/26/2022 6.0  5.0 - 8.0 Final   • Specific Gravity, UA 07/26/2022 1.010  1.005 - 1.030 Final   • Glucose, UA 07/26/2022 Negative  Negative Final   • Ketones, UA 07/26/2022 Negative  Negative Final   • Bilirubin, UA 07/26/2022 Negative  Negative Final   • Blood, UA 07/26/2022 Negative  Negative Final   • Protein, UA 07/26/2022 Negative  Negative Final   • Leuk Esterase, UA 07/26/2022 Moderate (2+) (A) Negative Final   • Nitrite, UA 07/26/2022 Positive (A) Negative Final   • Urobilinogen, UA 07/26/2022 0.2 E.U./dL  0.2 - 1.0 E.U./dL Final   • Target HR (85%) 07/27/2022 132  bpm Final   • Max. Pred. HR (100%) 07/27/2022 155  bpm Final   • Nuclear Prior Study 07/27/2022 3   Final   •  CV REST NUCLEAR ISOTOPE DOSE 07/27/2022 9.5  mCi Final   •  CV STRESS NUCLEAR ISOTOPE DOSE 07/27/2022 27.6  mCi Final   •  CV STRESS PROTOCOL 1 07/27/2022 Pharmacologic   Final   • Stage 1 07/27/2022 1   Final   • HR Stage 1 07/27/2022 90   Final   • BP Stage 1 07/27/2022 130/59   Final   • Duration Min Stage 1 07/27/2022 0   Final   • Duration Sec Stage 1 07/27/2022 10   Final   • Stress Dose  Regadenoson Stage 1 07/27/2022 0.4   Final   • Stress Comments Stage 1 07/27/2022 10 sec bolus injection   Final   • Baseline HR 07/27/2022 53  bpm Final   • Baseline BP 07/27/2022 143/70  mmHg Final   • Peak HR 07/27/2022 90  bpm Final   • Percent Max Pred HR 07/27/2022 58.06  % Final   • Percent Target HR 07/27/2022 68  % Final   • Peak BP 07/27/2022 130/59  mmHg Final   • Recovery HR 07/27/2022 88  bpm Final   • Recovery BP 07/27/2022 140/63  mmHg Final   • Nuc Stress EF 07/27/2022 80  % Final   • Target HR (85%) 07/27/2022 132  bpm Final   • Max. Pred. HR (100%) 07/27/2022 155  bpm Final   • LA ESV Index (BP) 07/27/2022 16.8  ml/m2 Final   • Avg E/e' ratio 07/27/2022 9.26   Final   • ACS 07/27/2022 1.50  cm Final   • Ao root diam 07/27/2022 2.5  cm Final   • Ao pk joshua 07/27/2022 143.0  cm/sec Final   • Ao V2 VTI 07/27/2022 25.5  cm Final   • EDV(cubed) 07/27/2022 46.7  ml Final   • EDV(MOD-sp4) 07/27/2022 39.9  ml Final   • EF(MOD-sp4) 07/27/2022 57.1  % Final   • ESV(cubed) 07/27/2022 12.2  ml Final   • ESV(MOD-sp4) 07/27/2022 17.1  ml Final   • IVS/LVPW 07/27/2022 0.92  cm Final   • Lat Peak E' Joshua 07/27/2022 6.9  cm/sec Final   • LV mass(C)d 07/27/2022 132.7  grams Final   • LV V1 max PG 07/27/2022 4.3  mmHg Final   • LV V1 mean PG 07/27/2022 2.00  mmHg Final   • LV V1 max 07/27/2022 104.0  cm/sec Final   • LVPWd 07/27/2022 1.20  cm Final   • Med Peak E' Ojshua 07/27/2022 6.4  cm/sec Final   • MV dec slope 07/27/2022 250.0  cm/sec2 Final   • MV dec time 07/27/2022 0.26  msec Final   • MV V2 VTI 07/27/2022 18.2  cm Final   • PA acc time 07/27/2022 0.07  sec Final   • PA pr(Accel) 07/27/2022 48.8  mmHg Final   • PA V2 max 07/27/2022 112.0  cm/sec Final   • PI end-d joshua 07/27/2022 122.5  cm/sec Final   • RAP systole 07/27/2022 10.0  mmHg Final   • RVSP(TR) 07/27/2022 18.3  mmHg Final   • SI(MOD-sp4) 07/27/2022 13.5  ml/m2 Final   • SV(MOD-sp4) 07/27/2022 22.8  ml Final   • TR max PG 07/27/2022 8.3  mmHg Final   •  Ao max PG 07/27/2022 8.2  mmHg Final   • Ao mean PG 07/27/2022 4.0  mmHg Final   • FS 07/27/2022 36.1  % Final   • IVSd 07/27/2022 1.10  cm Final   • LA dimension (2D)  07/27/2022 3.4  cm Final   • LV V1 VTI 07/27/2022 18.0  cm Final   • LVIDd 07/27/2022 3.6  cm Final   • LVIDs 07/27/2022 2.30  cm Final   • MV E/A 07/27/2022 0.69   Final   • MV max PG 07/27/2022 3.1  mmHg Final   • MV mean PG 07/27/2022 2.00  mmHg Final   • MV A max alonso 07/27/2022 89.0  cm/sec Final   • MV E max alonso 07/27/2022 61.6  cm/sec Final   • TR max alonso 07/27/2022 144.0  cm/sec Final   • LV Iglesias Vol (BSA corrected) 07/27/2022 23.6  cm2 Final   • LV Sys Vol (BSA corrected) 07/27/2022 10.1  cm2 Final   • TAPSE (>1.6) 07/27/2022 2.11  cm Final   • COVID19 07/28/2022 Not Detected  Not Detected - Ref. Range Final   • Glucose 08/02/2022 52 (A) 70 - 130 mg/dL Final    Meter: [id number] : [id number] [facility]   • Glucose 08/02/2022 105  70 - 130 mg/dL Final    Meter: [id number] : [id number] [facility] Outpatient Visit on 07/29/2022   Component Date Value Ref Range Status   • Target HR (85%) 07/29/2022 132  bpm Final   • Max. Pred. HR (100%) 07/29/2022 155  bpm Final   Admission on 07/25/2022, Discharged on 07/25/2022   Component Date Value Ref Range Status   • Glucose 07/25/2022 111 (A) 65 - 99 mg/dL Final   • BUN 07/25/2022 5 (A) 8 - 23 mg/dL Final   • Creatinine 07/25/2022 0.71  0.57 - 1.00 mg/dL Final   • Sodium 07/25/2022 133 (A) 136 - 145 mmol/L Final   • Potassium 07/25/2022 4.1  3.5 - 5.2 mmol/L Final   • Chloride 07/25/2022 98  98 - 107 mmol/L Final   • CO2 07/25/2022 19.9 (A) 22.0 - 29.0 mmol/L Final   • Calcium 07/25/2022 10.4  8.6 - 10.5 mg/dL Final   • Total Protein 07/25/2022 7.4  6.0 - 8.5 g/dL Final   • Albumin 07/25/2022 4.92  3.50 - 5.20 g/dL Final   • ALT (SGPT) 07/25/2022 12  1 - 33 U/L Final   • AST (SGOT) 07/25/2022 20  1 - 32 U/L Final   • Alkaline Phosphatase 07/25/2022 100  39 - 117 U/L Final   • Total  Bilirubin 07/25/2022 0.2  0.0 - 1.2 mg/dL Final   • Globulin 07/25/2022 2.5  gm/dL Final   • A/G Ratio 07/25/2022 2.0  g/dL Final   • BUN/Creatinine Ratio 07/25/2022 7.0  7.0 - 25.0 Final   • Anion Gap 07/25/2022 15.1 (A) 5.0 - 15.0 mmol/L Final   • eGFR 07/25/2022 94.5  >60.0 mL/min/1.73 Final    National Kidney Foundation and American Society of Nephrology (ASN) Task Force recommended calculation based on the Chronic Kidney Disease Epidemiology Collaboration (CKD-EPI) equation refit without adjustment for race.   • Color, UA 07/25/2022 Yellow  Yellow, Straw Final   • Appearance, UA 07/25/2022 Clear  Clear Final   • pH, UA 07/25/2022 7.0  5.0 - 8.0 Final   • Specific Gravity, UA 07/25/2022 1.006  1.005 - 1.030 Final   • Glucose, UA 07/25/2022 Negative  Negative Final   • Ketones, UA 07/25/2022 Negative  Negative Final   • Bilirubin, UA 07/25/2022 Negative  Negative Final   • Blood, UA 07/25/2022 Negative  Negative Final   • Protein, UA 07/25/2022 Negative  Negative Final   • Leuk Esterase, UA 07/25/2022 Large (3+) (A) Negative Final   • Nitrite, UA 07/25/2022 Negative  Negative Final   • Urobilinogen, UA 07/25/2022 0.2 E.U./dL  0.2 - 1.0 E.U./dL Final   • Ethanol 07/25/2022 <10  0 - 10 mg/dL Final   • Ethanol % 07/25/2022 <0.010  % Final   • THC, Screen, Urine 07/25/2022 Negative  Negative Final   • Phencyclidine (PCP), Urine 07/25/2022 Negative  Negative Final   • Cocaine Screen, Urine 07/25/2022 Negative  Negative Final   • Methamphetamine, Ur 07/25/2022 Negative  Negative Final   • Opiate Screen 07/25/2022 Negative  Negative Final   • Amphetamine Screen, Urine 07/25/2022 Negative  Negative Final   • Benzodiazepine Screen, Urine 07/25/2022 Negative  Negative Final   • Tricyclic Antidepressants Screen 07/25/2022 Negative  Negative Final   • Methadone Screen, Urine 07/25/2022 Negative  Negative Final   • Barbiturates Screen, Urine 07/25/2022 Negative  Negative Final   • Oxycodone Screen, Urine 07/25/2022 Negative   Negative Final   • Propoxyphene Screen 07/25/2022 Negative  Negative Final   • Buprenorphine, Screen, Urine 07/25/2022 Negative  Negative Final   • Magnesium 07/25/2022 1.9  1.6 - 2.4 mg/dL Final   • COVID19 07/25/2022 Not Detected  Not Detected - Ref. Range Final   • Influenza A PCR 07/25/2022 Not Detected  Not Detected Final   • Influenza B PCR 07/25/2022 Not Detected  Not Detected Final   • WBC 07/25/2022 5.31  3.40 - 10.80 10*3/mm3 Final   • RBC 07/25/2022 4.10  3.77 - 5.28 10*6/mm3 Final   • Hemoglobin 07/25/2022 11.7 (A) 12.0 - 15.9 g/dL Final   • Hematocrit 07/25/2022 35.8  34.0 - 46.6 % Final   • MCV 07/25/2022 87.3  79.0 - 97.0 fL Final   • MCH 07/25/2022 28.5  26.6 - 33.0 pg Final   • MCHC 07/25/2022 32.7  31.5 - 35.7 g/dL Final   • RDW 07/25/2022 14.0  12.3 - 15.4 % Final   • RDW-SD 07/25/2022 44.8  37.0 - 54.0 fl Final   • MPV 07/25/2022 9.2  6.0 - 12.0 fL Final   • Platelets 07/25/2022 535 (A) 140 - 450 10*3/mm3 Final   • Neutrophil % 07/25/2022 51.5  42.7 - 76.0 % Final   • Lymphocyte % 07/25/2022 36.5  19.6 - 45.3 % Final   • Monocyte % 07/25/2022 7.9  5.0 - 12.0 % Final   • Eosinophil % 07/25/2022 2.4  0.3 - 6.2 % Final   • Basophil % 07/25/2022 1.5  0.0 - 1.5 % Final   • Immature Grans % 07/25/2022 0.2  0.0 - 0.5 % Final   • Neutrophils, Absolute 07/25/2022 2.73  1.70 - 7.00 10*3/mm3 Final   • Lymphocytes, Absolute 07/25/2022 1.94  0.70 - 3.10 10*3/mm3 Final   • Monocytes, Absolute 07/25/2022 0.42  0.10 - 0.90 10*3/mm3 Final   • Eosinophils, Absolute 07/25/2022 0.13  0.00 - 0.40 10*3/mm3 Final   • Basophils, Absolute 07/25/2022 0.08  0.00 - 0.20 10*3/mm3 Final   • Immature Grans, Absolute 07/25/2022 0.01  0.00 - 0.05 10*3/mm3 Final   • nRBC 07/25/2022 0.0  0.0 - 0.2 /100 WBC Final   • Extra Tube 07/25/2022 Hold for add-ons.   Final    Auto resulted.   • Extra Tube 07/25/2022 hold for add-on   Final    Auto resulted   • Extra Tube 07/25/2022 Hold for add-ons.   Final    Auto resulted.   • Extra Tube  07/25/2022 Hold for add-ons.   Final    Auto resulted   • RBC, UA 07/25/2022 0-2  None Seen, 0-2 /HPF Final   • WBC, UA 07/25/2022 31-50 (A) None Seen, 0-2 /HPF Final   • Bacteria, UA 07/25/2022 None Seen  None Seen /HPF Final   • Squamous Epithelial Cells, UA 07/25/2022 None Seen  None Seen, 0-2 /HPF Final   • Hyaline Casts, UA 07/25/2022 3-6  None Seen /LPF Final   • Methodology 07/25/2022 Automated Microscopy   Final   • Urine Culture 07/25/2022 >100,000 CFU/mL Escherichia coli (A)  Final        Condition on Discharge:  improved    Vital Signs  Temp:  [97 °F (36.1 °C)-97.1 °F (36.2 °C)] 97.1 °F (36.2 °C)  Heart Rate:  [85-94] 94  Resp:  [16] 16  BP: (119-144)/(72-79) 119/72      Discharge Disposition:  Home or Self Care    Discharge Medications:     Discharge Medications      New Medications      Instructions Start Date   ARIPiprazole 2 MG tablet  Commonly known as: ABILIFY   2 mg, Oral, Daily   Start Date: August 3, 2022     mirtazapine 30 MG tablet  Commonly known as: REMERON   30 mg, Oral, Nightly      sertraline 100 MG tablet  Commonly known as: ZOLOFT   100 mg, Oral, Daily   Start Date: August 3, 2022        Continue These Medications      Instructions Start Date   Diclofenac Sodium 1 % gel gel  Commonly known as: VOLTAREN   2 g, Topical, 4 Times Daily PRN      levothyroxine 50 MCG tablet  Commonly known as: SYNTHROID, LEVOTHROID   50 mcg, Oral, Daily      pantoprazole 40 MG EC tablet  Commonly known as: PROTONIX   40 mg, Oral, 2 Times Daily      polyvinyl alcohol 1.4 % ophthalmic solution  Commonly known as: LIQUIFILM   1 drop, Both Eyes, As Needed      saline gel nasal gel   1 application, Topical, As Needed         Stop These Medications    gabapentin 800 MG tablet  Commonly known as: NEURONTIN     oxyCODONE-acetaminophen  MG per tablet  Commonly known as: PERCOCET     tiZANidine 4 MG tablet  Commonly known as: ZANAFLEX            Discharge Diet:    Diet Instructions    Resume your regular diet as  tolerated.           Activity at Discharge:    Activity Instructions    Resume your regular activities as tolerated.           Follow-up Appointments      [facility] [address] [place] [address] [phone]           Time spent in discharge: > 30 min    Clinician:   Bryce Allison MD  08/02/22  13:23 EDT

## 2022-08-02 NOTE — PROGRESS NOTES
"INPATIENT PSYCHIATRIC PROGRESS NOTE    Name:  Winter Ayers  :  1955  MRN:  [id number]  Visit Number:  [id number]  Length of stay:  8    SUBJECTIVE    CC/Focus of Exam: depressioni    INTERVAL HISTORY:  The patient reports she is feeling a lot better today. She had a scare earlier when her sugar bottomed out but recovered after she was given orange juice and banana. She denies feeling hopeless or suicidal.   Depression rating 5/10  Anxiety rating 5/10  Sleep: good  Withdrawal sx: denies  Cravin/10    Review of Systems   Respiratory: Negative.    Cardiovascular: Negative.    Gastrointestinal: Negative.        OBJECTIVE    Temp:  [97 °F (36.1 °C)-97.1 °F (36.2 °C)] 97.1 °F (36.2 °C)  Heart Rate:  [85-94] 94  Resp:  [16] 16  BP: (119-144)/(72-79) 119/72    MENTAL STATUS EXAM:  Appearance:Casually dressed, good hygeine.   Cooperation:Cooperative  Psychomotor: No psychomotor agitation/retardation, No EPS, No motor tics  Speech-normal rate, amount.  Mood \"better\"   Affect-congruent, appropriate, stable  Thought Content-goal directed, no delusional material present  Thought process-linear, organized.  Suicidality: No SI  Homicidality: No HI  Perception: No AH/VH  Insight-fair   Judgement-fair    Lab Results (last 24 hours)     Procedure Component Value Units Date/Time    POC Glucose Once [[id number]]  (Normal) Collected: 22 1050    Specimen: Blood Updated: 22 1056     Glucose 105 mg/dL      Comment: Meter: [id number] : [id number] GHAZALA VELEZ       POC Glucose Once [[id number]]  (Abnormal) Collected: 22 1036    Specimen: Blood Updated: 22 1056     Glucose 52 mg/dL      Comment: Meter: [id number] : [id number] GHAZALA VELEZ                Imaging Results (Last 24 Hours)     ** No results found for the last 24 hours. **             ECG/EMG Results (most recent)     Procedure Component Value Units Date/Time    ECG 12 Lead [[id number]] Collected: 22 0157     Updated: 22 " 1334     QT Interval 442 ms      QTC Interval 426 ms     Narrative:      Test Reason : Baseline Cardiac Status  Blood Pressure :   */*   mmHG  Vent. Rate :  56 BPM     Atrial Rate :  56 BPM     P-R Int : 166 ms          QRS Dur : 100 ms      QT Int : 442 ms       P-R-T Axes :  63  29  30 degrees     QTc Int : 426 ms    Sinus bradycardia with sinus arrhythmia  T wave abnormality, consider anterior ischemia  Abnormal ECG  No previous ECGs available  Confirmed by Luther Leyva ([id number]) on 7/27/2022 1:34:32 PM    Referred By: WILLA           Confirmed By: Luther Leyva    Adult Transthoracic Echo Complete W/ Cont if Necessary Per Protocol [[id number]] Resulted: 07/27/22 2138     Updated: 07/27/22 2147     Target HR (85%) 132 bpm      Max. Pred. HR (100%) 155 bpm      LA ESV Index (BP) 16.8 ml/m2      Avg E/e' ratio 9.26     ACS 1.50 cm      Ao root diam 2.5 cm      Ao pk joshua 143.0 cm/sec      Ao V2 VTI 25.5 cm      EDV(cubed) 46.7 ml      EDV(MOD-sp4) 39.9 ml      EF(MOD-sp4) 57.1 %      ESV(cubed) 12.2 ml      ESV(MOD-sp4) 17.1 ml      IVS/LVPW 0.92 cm      Lat Peak E' Joshua 6.9 cm/sec      LV mass(C)d 132.7 grams      LV V1 max PG 4.3 mmHg      LV V1 mean PG 2.00 mmHg      LV V1 max 104.0 cm/sec      LVPWd 1.20 cm      Med Peak E' Joshua 6.4 cm/sec      MV dec slope 250.0 cm/sec2      MV dec time 0.26 msec      MV V2 VTI 18.2 cm      PA acc time 0.07 sec      PA pr(Accel) 48.8 mmHg      PA V2 max 112.0 cm/sec      PI end-d joshua 122.5 cm/sec      RAP systole 10.0 mmHg      RVSP(TR) 18.3 mmHg      SI(MOD-sp4) 13.5 ml/m2      SV(MOD-sp4) 22.8 ml      TR max PG 8.3 mmHg      Ao max PG 8.2 mmHg      Ao mean PG 4.0 mmHg      FS 36.1 %      IVSd 1.10 cm      LA dimension (2D)  3.4 cm      LV V1 VTI 18.0 cm      LVIDd 3.6 cm      LVIDs 2.30 cm      MV E/A 0.69     MV max PG 3.1 mmHg      MV mean PG 2.00 mmHg      MV A max joshua 89.0 cm/sec      MV E max joshua 61.6 cm/sec      TR max joshua 144.0 cm/sec      LV Iglesias Vol (BSA  corrected) 23.6 cm2      LV Sys Vol (BSA corrected) 10.1 cm2      TAPSE (>1.6) 2.11 cm     Narrative:      · Left ventricular wall thickness is consistent with borderline concentric   hypertrophy.  · Left ventricular ejection fraction appears to be 61 - 65%. Left   ventricular systolic function is normal.  · Left ventricular diastolic function is consistent with (grade I)   impaired relaxation.  · Estimated right ventricular systolic pressure from tricuspid   regurgitation is normal (<35 mmHg).  · A small echogenic shaddow seen at the aortic root, likely artifact,   consider additional imaging study if clinically indicated.              ALLERGIES: Hydroxychloroquine sulfate, Pregabalin, Sulfa antibiotics, and Ultram [tramadol hcl]      Current Facility-Administered Medications:   •  acetaminophen (TYLENOL) tablet 650 mg, 650 mg, Oral, Q6H PRN, Josh Bess MD, 650 mg at 07/28/22 2108  •  aluminum-magnesium hydroxide-simethicone (MAALOX MAX) 400-400-40 MG/5ML suspension 15 mL, 15 mL, Oral, Q6H PRN, Josh Bess MD  •  ARIPiprazole (ABILIFY) tablet 2 mg, 2 mg, Oral, Daily, Josh Bess MD, 2 mg at 08/02/22 0825  •  benzonatate (TESSALON) capsule 100 mg, 100 mg, Oral, TID PRN, Josh Bess MD  •  benztropine (COGENTIN) tablet 2 mg, 2 mg, Oral, Once PRN **OR** benztropine (COGENTIN) injection 1 mg, 1 mg, Intramuscular, Once PRN, Josh Bess MD  •  Diclofenac Sodium (VOLTAREN) 1 % gel 2 g, 2 g, Topical, 4x Daily PRN, Josh Bess MD, 2 g at 08/01/22 1228  •  famotidine (PEPCID) tablet 20 mg, 20 mg, Oral, BID PRN, Josh Bess MD  •  hydrOXYzine (ATARAX) tablet 50 mg, 50 mg, Oral, Q6H PRN, Josh Bess MD, 50 mg at 08/01/22 0345  •  ibuprofen (ADVIL,MOTRIN) tablet 400 mg, 400 mg, Oral, Q6H PRN, Josh Bess MD, 400 mg at 08/01/22 2105  •  levothyroxine (SYNTHROID, LEVOTHROID) tablet 50 mcg, 50 mcg, Oral,  Daily, Josh Bess MD, 50 mcg at 08/02/22 0825  •  loperamide (IMODIUM) capsule 2 mg, 2 mg, Oral, Q2H PRN, Josh Bess MD  •  magnesium hydroxide (MILK OF MAGNESIA) suspension 10 mL, 10 mL, Oral, Daily PRN, Josh Bess MD, 10 mL at 08/01/22 1007  •  melatonin tablet 5 mg, 5 mg, Oral, Nightly PRN, Shelia Spencer MD  •  mirtazapine (REMERON) tablet 30 mg, 30 mg, Oral, Nightly, Josh Bess MD, 30 mg at 08/01/22 2259  •  nicotine (NICODERM CQ) 14 MG/24HR patch 1 patch, 1 patch, Transdermal, Q24H, Josh Bess MD, 1 patch at 08/02/22 0825  •  ondansetron (ZOFRAN) tablet 4 mg, 4 mg, Oral, Q6H PRN, Josh Bess MD, 4 mg at 08/01/22 1756  •  pantoprazole (PROTONIX) EC tablet 40 mg, 40 mg, Oral, BID, Josh Bess MD, 40 mg at 08/02/22 0825  •  Pharmacy Consult, , Does not apply, Continuous PRN, Josh Bess MD  •  polyethylene glycol (MIRALAX) packet 17 g, 17 g, Oral, Daily PRN, Josh Bess MD, 17 g at 08/02/22 0825  •  Polyvinyl Alcohol-Povidone PF (HYPOTEARS) 1.4-0.6 % ophthalmic solution 1 drop, 1 drop, Both Eyes, Q3H PRN, Josh Bess MD, 1 drop at 08/01/22 1228  •  sertraline (ZOLOFT) tablet 100 mg, 100 mg, Oral, Daily, Josh Bess MD, 100 mg at 08/02/22 0825  •  sodium chloride nasal spray 2 spray, 2 spray, Each Nare, PRN, Josh Bess MD, 2 spray at 08/01/22 1228    ASSESSMENT & PLAN:    Major depressive disorder, recurrent episode, severe, with psychosis (HCC)  - Continue Abilify, Sertraline, Mirtazapine     Post traumatic stress disorder (PTSD)  - Sertraline      Hiatal hernia with GERD  - Pantoprazole     Lumbar back pain  - Ibuprofen     Cervical back pain with evidence of disc disease  - Ibuprofen       Hypothyroidism  - Levothyroxine     Plan discharge tomorrow.     Special precautions: Special Precautions Level 3 (q15 min checks)  .    Behavioral Health Treatment Plan and Problem List: I have reviewed and approved the Behavioral Health Treatment Plan and Problem list.  The patient has had a chance to review and agrees with the treatment plan.     Clinician:  Bryce Allison MD  08/02/22  13:01 EDT

## 2022-08-02 NOTE — PLAN OF CARE
Problem: Adult Behavioral Health Plan of Care  Goal: Develops/Participates in Therapeutic [place] to Support Successful Transition  Intervention: Mutually Develop Transition Plan  Recent Flowsheet Documentation  Taken 8/2/2022 1341 by Christen Davenport LCSW  Transportation Anticipated: family or friend will provide  Current Discharge Risk: psychiatric illness  Concerns to be Addressed:   coping/stress   mental health  Readmission Within the Last 30 Days: no previous admission in last 30 days  Patient/Family Anticipated Services at Transition:   outpatient care   mental health services  Patient/Family Anticipates Transition to: home  Offered/Gave Vendor List: no    Discussed patient with nursing staff, Dr. Allison and met with patient at her bedside today.  She had an incident of her blood sugar level dropping and was able to recover with some orange juice and banana.  She reports this happens occasionally since her gastric sleeve surgery.  She reported feeling much better and ready for discharge.  She denies suicidal ideation today.  Her friend will transport her home today and patient will follow up with NATHALY Duong.

## 2022-08-02 NOTE — PROGRESS NOTES
Pharmacy spoke with patient again about cessation aids. Patient decline cessation aids or outpatient follow up at discharge.    Thank you,    Laure Reeder, Tom  08/02/22  13:24 EDT

## 2022-08-02 NOTE — PAYOR COMM NOTE
"Yadira Ayers (67 y.o. Female)             Date of Birth   1955    Social Security Number       Address   [address] [place] [address]    Home Phone   [phone]    MRN   [id number]       Roman Catholic   Sabianist    Marital Status   Single                            Admission Date   7/25/22    Admission Type   Emergency    Admitting Provider   Josue Bess MD    Attending Provider       Department, Room/Bed   [facility], 1025/1S       Discharge Date   8/2/2022    Discharge Disposition   Home or Self Care    Discharge Destination                               Attending Provider: (none)   Allergies: Hydroxychloroquine Sulfate, Pregabalin, Sulfa Antibiotics, Ultram [Tramadol Hcl]    Isolation: None   Infection: None   Code Status: Prior   Advance Care Planning Activity    Ht: 152.4 cm (60\")   Wt: 72.3 kg (159 lb 6.4 oz)    Admission Cmt: None   Principal Problem: Major depressive disorder, recurrent episode, severe, with psychosis (HCC) [F33.3]                 Active Insurance as of 7/25/2022     Primary Coverage     Payor Plan Insurance Group Employer/Plan Group    HUMANA MEDICARE REPLACEMENT HUMANA MEDICARE REPLACEMENT [id number]     Payor Plan Address Payor Plan Phone Number Payor Plan Fax Number Effective Dates    [address] [address] [phone]  1/1/2018 - None Entered    [facility] [id number]       Subscriber Name Subscriber Birth Date Member ID       YADIRA AYERS 1955 [id number]                 Emergency Contacts      (Rel.) Home Phone Work Phone Mobile Phone    Laureano Prakash (Friend) -- -- [phone]          PLEASE ATTACH THIS DISCHARGE INFORMATION TO AUTH. # [id number].      DISCHARGE DATE: 08/02/2022.    Discharge Diagnosis:  Principal Problem:    Major depressive disorder, recurrent episode, severe, with psychosis (F33.3)  Active Problems:    Post traumatic stress disorder (F43.10)    Hiatal hernia with GERD    Lumbar back " pain    Cervical back pain with evidence of disc disease    Chest pain    Hypothyroidism       FOLLOW UP:  [facility]  [address] [id number] [phone]        You can present as a walk-in for initial appointment any day Monday through Friday between 9:00 am and 2:00 pm      at 12:00 pm              Discharge Summary      Bryce Allison MD at 22 1323          :  1955  MRN:  [id number]  Visit Number:  [id number]      Date of Admission:2022   Date of Discharge:  2022    Discharge Diagnosis:  Principal Problem:    Major depressive disorder, recurrent episode, severe, with psychosis (HCC)  Active Problems:    Post traumatic stress disorder (PTSD)    Hiatal hernia with GERD    Lumbar back pain    Cervical back pain with evidence of disc disease    Chest pain    Hypothyroidism        Admission Diagnosis:  MDD (major depressive disorder) [F32.9]     HPI  Winter Ayers is a 67 y.o. female who was admitted on 2022 with complaints of depression with suicidal attempt.  For details please see H&P dated 22.    Hospital Course  Patient is a 67 y.o. female presented with depression and suicide attempt. The patient was admitted to the [facility] senior unit for safety, further evaluation and treatment.  The patient was started on sertraline and augmented with aripiprazole and mirtazapine was added. The patient was able to take the medications without any adverse effects.   The patient was also able to take part in individual and group counseling sessions and work on appropriate coping skills. She was encouraged and she appeared receptive to adjust her expectations to a more realistic level.   The patient made steady improvement in her mood and expressed feeling more positive and hopeful about future. Sleep and appetite were improved.  The day of discharge the patient was calm, cooperative and pleasant. Mood was reported to  "be good, and denied SI/HI/AVH. Also reported no medication side effects.  .      Mental Status Exam upon discharge:   Mood \"good\"   Affect-congruent, appropriate, stable  Thought Content-goal directed, no delusional material present  Thought process-linear, organized.  Suicidality: No SI  Homicidality: No HI  Perception: No AH/VH    Procedures Performed         Consults:   Consults     Date and Time Order Name Status Description    7/26/2022 11:42 AM Inpatient Cardiology Consult Completed           Pertinent Test Results:   Admission on 07/25/2022   Component Date Value Ref Range Status   • QT Interval 07/26/2022 442  ms Final   • QTC Interval 07/26/2022 426  ms Final   • Hemoglobin A1C 07/26/2022 4.70 (A) 4.80 - 5.60 % Final   • Total Cholesterol 07/26/2022 172  0 - 200 mg/dL Final   • Triglycerides 07/26/2022 55  0 - 150 mg/dL Final   • HDL Cholesterol 07/26/2022 67 (A) 40 - 60 mg/dL Final   • LDL Cholesterol  07/26/2022 94  0 - 100 mg/dL Final   • VLDL Cholesterol 07/26/2022 11  5 - 40 mg/dL Final   • LDL/HDL Ratio 07/26/2022 1.40   Final   • TSH 07/26/2022 1.400  0.270 - 4.200 uIU/mL Final   • Free T4 07/26/2022 1.04  0.93 - 1.70 ng/dL Final   • Color, UA 07/26/2022 Yellow  Yellow, Straw Final   • Appearance, UA 07/26/2022 Clear  Clear Final   • pH, UA 07/26/2022 6.0  5.0 - 8.0 Final   • Specific Gravity, UA 07/26/2022 1.010  1.005 - 1.030 Final   • Glucose, UA 07/26/2022 Negative  Negative Final   • Ketones, UA 07/26/2022 Negative  Negative Final   • Bilirubin, UA 07/26/2022 Negative  Negative Final   • Blood, UA 07/26/2022 Negative  Negative Final   • Protein, UA 07/26/2022 Negative  Negative Final   • Leuk Esterase, UA 07/26/2022 Moderate (2+) (A) Negative Final   • Nitrite, UA 07/26/2022 Positive (A) Negative Final   • Urobilinogen, UA 07/26/2022 0.2 E.U./dL  0.2 - 1.0 E.U./dL Final   • Target HR (85%) 07/27/2022 132  bpm Final   • Max. Pred. HR (100%) 07/27/2022 155  bpm Final   • Nuclear Prior Study " 07/27/2022 3   Final   • BH CV REST NUCLEAR ISOTOPE DOSE 07/27/2022 9.5  mCi Final   • BH CV STRESS NUCLEAR ISOTOPE DOSE 07/27/2022 27.6  mCi Final   • BH CV STRESS PROTOCOL 1 07/27/2022 Pharmacologic   Final   • Stage 1 07/27/2022 1   Final   • HR Stage 1 07/27/2022 90   Final   • BP Stage 1 07/27/2022 130/59   Final   • Duration Min Stage 1 07/27/2022 0   Final   • Duration Sec Stage 1 07/27/2022 10   Final   • Stress Dose Regadenoson Stage 1 07/27/2022 0.4   Final   • Stress Comments Stage 1 07/27/2022 10 sec bolus injection   Final   • Baseline HR 07/27/2022 53  bpm Final   • Baseline BP 07/27/2022 143/70  mmHg Final   • Peak HR 07/27/2022 90  bpm Final   • Percent Max Pred HR 07/27/2022 58.06  % Final   • Percent Target HR 07/27/2022 68  % Final   • Peak BP 07/27/2022 130/59  mmHg Final   • Recovery HR 07/27/2022 88  bpm Final   • Recovery BP 07/27/2022 140/63  mmHg Final   • Nuc Stress EF 07/27/2022 80  % Final   • Target HR (85%) 07/27/2022 132  bpm Final   • Max. Pred. HR (100%) 07/27/2022 155  bpm Final   • LA ESV Index (BP) 07/27/2022 16.8  ml/m2 Final   • Avg E/e' ratio 07/27/2022 9.26   Final   • ACS 07/27/2022 1.50  cm Final   • Ao root diam 07/27/2022 2.5  cm Final   • Ao pk joshua 07/27/2022 143.0  cm/sec Final   • Ao V2 VTI 07/27/2022 25.5  cm Final   • EDV(cubed) 07/27/2022 46.7  ml Final   • EDV(MOD-sp4) 07/27/2022 39.9  ml Final   • EF(MOD-sp4) 07/27/2022 57.1  % Final   • ESV(cubed) 07/27/2022 12.2  ml Final   • ESV(MOD-sp4) 07/27/2022 17.1  ml Final   • IVS/LVPW 07/27/2022 0.92  cm Final   • Lat Peak E' Joshua 07/27/2022 6.9  cm/sec Final   • LV mass(C)d 07/27/2022 132.7  grams Final   • LV V1 max PG 07/27/2022 4.3  mmHg Final   • LV V1 mean PG 07/27/2022 2.00  mmHg Final   • LV V1 max 07/27/2022 104.0  cm/sec Final   • LVPWd 07/27/2022 1.20  cm Final   • Med Peak E' Joshua 07/27/2022 6.4  cm/sec Final   • MV dec slope 07/27/2022 250.0  cm/sec2 Final   • MV dec time 07/27/2022 0.26  msec Final   • MV V2  VTI 07/27/2022 18.2  cm Final   • PA acc time 07/27/2022 0.07  sec Final   • PA pr(Accel) 07/27/2022 48.8  mmHg Final   • PA V2 max 07/27/2022 112.0  cm/sec Final   • PI end-d alonso 07/27/2022 122.5  cm/sec Final   • RAP systole 07/27/2022 10.0  mmHg Final   • RVSP(TR) 07/27/2022 18.3  mmHg Final   • SI(MOD-sp4) 07/27/2022 13.5  ml/m2 Final   • SV(MOD-sp4) 07/27/2022 22.8  ml Final   • TR max PG 07/27/2022 8.3  mmHg Final   • Ao max PG 07/27/2022 8.2  mmHg Final   • Ao mean PG 07/27/2022 4.0  mmHg Final   • FS 07/27/2022 36.1  % Final   • IVSd 07/27/2022 1.10  cm Final   • LA dimension (2D)  07/27/2022 3.4  cm Final   • LV V1 VTI 07/27/2022 18.0  cm Final   • LVIDd 07/27/2022 3.6  cm Final   • LVIDs 07/27/2022 2.30  cm Final   • MV E/A 07/27/2022 0.69   Final   • MV max PG 07/27/2022 3.1  mmHg Final   • MV mean PG 07/27/2022 2.00  mmHg Final   • MV A max alonso 07/27/2022 89.0  cm/sec Final   • MV E max alonso 07/27/2022 61.6  cm/sec Final   • TR max alonso 07/27/2022 144.0  cm/sec Final   • LV Iglesias Vol (BSA corrected) 07/27/2022 23.6  cm2 Final   • LV Sys Vol (BSA corrected) 07/27/2022 10.1  cm2 Final   • TAPSE (>1.6) 07/27/2022 2.11  cm Final   • COVID19 07/28/2022 Not Detected  Not Detected - Ref. Range Final   • Glucose 08/02/2022 52 (A) 70 - 130 mg/dL Final    Meter: [id number] : [id number] GHAZALA VELEZ   • Glucose 08/02/2022 105  70 - 130 mg/dL Final    Meter: [id number] : [id number] [facility] Outpatient Visit on 07/29/2022   Component Date Value Ref Range Status   • Target HR (85%) 07/29/2022 132  bpm Final   • Max. Pred. HR (100%) 07/29/2022 155  bpm Final   Admission on 07/25/2022, Discharged on 07/25/2022   Component Date Value Ref Range Status   • Glucose 07/25/2022 111 (A) 65 - 99 mg/dL Final   • BUN 07/25/2022 5 (A) 8 - 23 mg/dL Final   • Creatinine 07/25/2022 0.71  0.57 - 1.00 mg/dL Final   • Sodium 07/25/2022 133 (A) 136 - 145 mmol/L Final   • Potassium 07/25/2022 4.1  3.5 - 5.2 mmol/L  Final   • Chloride 07/25/2022 98  98 - 107 mmol/L Final   • CO2 07/25/2022 19.9 (A) 22.0 - 29.0 mmol/L Final   • Calcium 07/25/2022 10.4  8.6 - 10.5 mg/dL Final   • Total Protein 07/25/2022 7.4  6.0 - 8.5 g/dL Final   • Albumin 07/25/2022 4.92  3.50 - 5.20 g/dL Final   • ALT (SGPT) 07/25/2022 12  1 - 33 U/L Final   • AST (SGOT) 07/25/2022 20  1 - 32 U/L Final   • Alkaline Phosphatase 07/25/2022 100  39 - 117 U/L Final   • Total Bilirubin 07/25/2022 0.2  0.0 - 1.2 mg/dL Final   • Globulin 07/25/2022 2.5  gm/dL Final   • A/G Ratio 07/25/2022 2.0  g/dL Final   • BUN/Creatinine Ratio 07/25/2022 7.0  7.0 - 25.0 Final   • Anion Gap 07/25/2022 15.1 (A) 5.0 - 15.0 mmol/L Final   • eGFR 07/25/2022 94.5  >60.0 mL/min/1.73 Final    National Kidney Foundation and American Society of Nephrology (ASN) Task Force recommended calculation based on the Chronic Kidney Disease Epidemiology Collaboration (CKD-EPI) equation refit without adjustment for race.   • Color, UA 07/25/2022 Yellow  Yellow, Straw Final   • Appearance, UA 07/25/2022 Clear  Clear Final   • pH, UA 07/25/2022 7.0  5.0 - 8.0 Final   • Specific Gravity, UA 07/25/2022 1.006  1.005 - 1.030 Final   • Glucose, UA 07/25/2022 Negative  Negative Final   • Ketones, UA 07/25/2022 Negative  Negative Final   • Bilirubin, UA 07/25/2022 Negative  Negative Final   • Blood, UA 07/25/2022 Negative  Negative Final   • Protein, UA 07/25/2022 Negative  Negative Final   • Leuk Esterase, UA 07/25/2022 Large (3+) (A) Negative Final   • Nitrite, UA 07/25/2022 Negative  Negative Final   • Urobilinogen, UA 07/25/2022 0.2 E.U./dL  0.2 - 1.0 E.U./dL Final   • Ethanol 07/25/2022 <10  0 - 10 mg/dL Final   • Ethanol % 07/25/2022 <0.010  % Final   • THC, Screen, Urine 07/25/2022 Negative  Negative Final   • Phencyclidine (PCP), Urine 07/25/2022 Negative  Negative Final   • Cocaine Screen, Urine 07/25/2022 Negative  Negative Final   • Methamphetamine, Ur 07/25/2022 Negative  Negative Final   • Opiate  Screen 07/25/2022 Negative  Negative Final   • Amphetamine Screen, Urine 07/25/2022 Negative  Negative Final   • Benzodiazepine Screen, Urine 07/25/2022 Negative  Negative Final   • Tricyclic Antidepressants Screen 07/25/2022 Negative  Negative Final   • Methadone Screen, Urine 07/25/2022 Negative  Negative Final   • Barbiturates Screen, Urine 07/25/2022 Negative  Negative Final   • Oxycodone Screen, Urine 07/25/2022 Negative  Negative Final   • Propoxyphene Screen 07/25/2022 Negative  Negative Final   • Buprenorphine, Screen, Urine 07/25/2022 Negative  Negative Final   • Magnesium 07/25/2022 1.9  1.6 - 2.4 mg/dL Final   • COVID19 07/25/2022 Not Detected  Not Detected - Ref. Range Final   • Influenza A PCR 07/25/2022 Not Detected  Not Detected Final   • Influenza B PCR 07/25/2022 Not Detected  Not Detected Final   • WBC 07/25/2022 5.31  3.40 - 10.80 10*3/mm3 Final   • RBC 07/25/2022 4.10  3.77 - 5.28 10*6/mm3 Final   • Hemoglobin 07/25/2022 11.7 (A) 12.0 - 15.9 g/dL Final   • Hematocrit 07/25/2022 35.8  34.0 - 46.6 % Final   • MCV 07/25/2022 87.3  79.0 - 97.0 fL Final   • MCH 07/25/2022 28.5  26.6 - 33.0 pg Final   • MCHC 07/25/2022 32.7  31.5 - 35.7 g/dL Final   • RDW 07/25/2022 14.0  12.3 - 15.4 % Final   • RDW-SD 07/25/2022 44.8  37.0 - 54.0 fl Final   • MPV 07/25/2022 9.2  6.0 - 12.0 fL Final   • Platelets 07/25/2022 535 (A) 140 - 450 10*3/mm3 Final   • Neutrophil % 07/25/2022 51.5  42.7 - 76.0 % Final   • Lymphocyte % 07/25/2022 36.5  19.6 - 45.3 % Final   • Monocyte % 07/25/2022 7.9  5.0 - 12.0 % Final   • Eosinophil % 07/25/2022 2.4  0.3 - 6.2 % Final   • Basophil % 07/25/2022 1.5  0.0 - 1.5 % Final   • Immature Grans % 07/25/2022 0.2  0.0 - 0.5 % Final   • Neutrophils, Absolute 07/25/2022 2.73  1.70 - 7.00 10*3/mm3 Final   • Lymphocytes, Absolute 07/25/2022 1.94  0.70 - 3.10 10*3/mm3 Final   • Monocytes, Absolute 07/25/2022 0.42  0.10 - 0.90 10*3/mm3 Final   • Eosinophils, Absolute 07/25/2022 0.13  0.00 -  0.40 10*3/mm3 Final   • Basophils, Absolute 07/25/2022 0.08  0.00 - 0.20 10*3/mm3 Final   • Immature Grans, Absolute 07/25/2022 0.01  0.00 - 0.05 10*3/mm3 Final   • nRBC 07/25/2022 0.0  0.0 - 0.2 /100 WBC Final   • Extra Tube 07/25/2022 Hold for add-ons.   Final    Auto resulted.   • Extra Tube 07/25/2022 hold for add-on   Final    Auto resulted   • Extra Tube 07/25/2022 Hold for add-ons.   Final    Auto resulted.   • Extra Tube 07/25/2022 Hold for add-ons.   Final    Auto resulted   • RBC, UA 07/25/2022 0-2  None Seen, 0-2 /HPF Final   • WBC, UA 07/25/2022 31-50 (A) None Seen, 0-2 /HPF Final   • Bacteria, UA 07/25/2022 None Seen  None Seen /HPF Final   • Squamous Epithelial Cells, UA 07/25/2022 None Seen  None Seen, 0-2 /HPF Final   • Hyaline Casts, UA 07/25/2022 3-6  None Seen /LPF Final   • Methodology 07/25/2022 Automated Microscopy   Final   • Urine Culture 07/25/2022 >100,000 CFU/mL Escherichia coli (A)  Final        Condition on Discharge:  improved    Vital Signs  Temp:  [97 °F (36.1 °C)-97.1 °F (36.2 °C)] 97.1 °F (36.2 °C)  Heart Rate:  [85-94] 94  Resp:  [16] 16  BP: (119-144)/(72-79) 119/72      Discharge Disposition:  Home or Self Care    Discharge Medications:     Discharge Medications      New Medications      Instructions Start Date   ARIPiprazole 2 MG tablet  Commonly known as: ABILIFY   2 mg, Oral, Daily   Start Date: August 3, 2022     mirtazapine 30 MG tablet  Commonly known as: REMERON   30 mg, Oral, Nightly      sertraline 100 MG tablet  Commonly known as: ZOLOFT   100 mg, Oral, Daily   Start Date: August 3, 2022        Continue These Medications      Instructions Start Date   Diclofenac Sodium 1 % gel gel  Commonly known as: VOLTAREN   2 g, Topical, 4 Times Daily PRN      levothyroxine 50 MCG tablet  Commonly known as: SYNTHROID, LEVOTHROID   50 mcg, Oral, Daily      pantoprazole 40 MG EC tablet  Commonly known as: PROTONIX   40 mg, Oral, 2 Times Daily      polyvinyl alcohol 1.4 % ophthalmic  solution  Commonly known as: LIQUIFILM   1 drop, Both Eyes, As Needed      saline gel nasal gel   1 application, Topical, As Needed         Stop These Medications    gabapentin 800 MG tablet  Commonly known as: NEURONTIN     oxyCODONE-acetaminophen  MG per tablet  Commonly known as: PERCOCET     tiZANidine 4 MG tablet  Commonly known as: ZANAFLEX            Discharge Diet:    Diet Instructions    Resume your regular diet as tolerated.           Activity at Discharge:    Activity Instructions    Resume your regular activities as tolerated.           Follow-up Appointments      [facility] [address] [place] [address] [phone]           Time spent in discharge: > 30 min    Clinician:   Bryce Allison MD  08/02/22  13:23 EDT    Electronically signed by Bryce Allison MD at 08/02/22 0098

## 2022-08-02 NOTE — NURSING NOTE
Patient blood sugar is now at 105. Patient reports feeling much better. No new orders at this time. Will continue to monitor closely.

## 2022-08-02 NOTE — PLAN OF CARE
Goal Outcome Evaluation:  Plan of Care Reviewed With: patient  Patient Agreement with Plan of Care: agrees     Progress: improving   Pt reports poor sleep good appetite. Pt rates anx 5/10 and dep 5/10. Pt denies SI/HI/AVH.

## 2022-09-05 NOTE — ANESTHESIA POSTPROCEDURE EVALUATION
Patient: Winter Ayers    Procedure Summary     Date: 07/29/22 Room / Location: [facility] NONINVASIVE LAB    Anesthesia Start: 1417 Anesthesia Stop: 1426    Procedure: ADULT TRANSESOPHAGEAL ECHO (SHAMAR) W/ CONT IF NECESSARY PER PROTOCOL Diagnosis:       Abnormal echocardiogram      Chest pain, unspecified type      (Non-diagnostic Echo)    Scheduled Providers:  Provider: Rivera Schmitt MD    Anesthesia Type: general ASA Status: 3          Anesthesia Type: general    Vitals  Vitals Value Taken Time   /72 08/02/22 0801   Temp 97.1 °F (36.2 °C) 08/02/22 0800   Pulse 94 08/02/22 0801   Resp 16 08/02/22 0800   SpO2 97 % 08/02/22 0800           Post Anesthesia Care and Evaluation    Patient location during evaluation: PACU  Patient participation: complete - patient participated  Level of consciousness: awake  Pain score: 1  Pain management: adequate    Airway patency: patent  Anesthetic complications: No anesthetic complications  PONV Status: none  Cardiovascular status: acceptable  Respiratory status: acceptable  Hydration status: acceptable

## 2024-02-12 ENCOUNTER — TRANSCRIBE ORDERS (OUTPATIENT)
Dept: ADMINISTRATIVE | Facility: HOSPITAL | Age: 69
End: 2024-02-12
Payer: MEDICARE

## 2024-02-12 DIAGNOSIS — Z12.31 VISIT FOR SCREENING MAMMOGRAM: ICD-10-CM

## 2024-02-12 DIAGNOSIS — Z78.0 ASYMPTOMATIC POSTMENOPAUSAL STATUS: Primary | ICD-10-CM

## 2024-03-25 ENCOUNTER — HOSPITAL ENCOUNTER (OUTPATIENT)
Dept: MAMMOGRAPHY | Facility: HOSPITAL | Age: 69
Discharge: HOME | End: 2024-03-25
Payer: MEDICARE

## 2024-03-25 ENCOUNTER — HOSPITAL ENCOUNTER (OUTPATIENT)
Dept: BONE DENSITY | Facility: HOSPITAL | Age: 69
Discharge: HOME | End: 2024-03-25
Payer: MEDICARE

## 2024-03-25 DIAGNOSIS — Z12.31 VISIT FOR SCREENING MAMMOGRAM: ICD-10-CM

## 2024-03-25 DIAGNOSIS — Z78.0 ASYMPTOMATIC POSTMENOPAUSAL STATUS: ICD-10-CM

## 2024-03-25 PROCEDURE — 77080 DXA BONE DENSITY AXIAL: CPT

## 2024-03-25 PROCEDURE — 77067 SCR MAMMO BI INCL CAD: CPT

## 2024-03-25 PROCEDURE — 77063 BREAST TOMOSYNTHESIS BI: CPT | Performed by: RADIOLOGY

## 2024-03-25 PROCEDURE — 77063 BREAST TOMOSYNTHESIS BI: CPT

## 2024-03-25 PROCEDURE — 77067 SCR MAMMO BI INCL CAD: CPT | Performed by: RADIOLOGY

## 2024-03-25 PROCEDURE — 77080 DXA BONE DENSITY AXIAL: CPT | Performed by: RADIOLOGY

## 2025-03-20 ENCOUNTER — TRANSCRIBE ORDERS (OUTPATIENT)
Dept: ADMINISTRATIVE | Facility: HOSPITAL | Age: 70
End: 2025-03-20
Payer: MEDICARE

## 2025-03-20 DIAGNOSIS — Z12.31 VISIT FOR SCREENING MAMMOGRAM: Primary | ICD-10-CM

## 2025-04-03 DIAGNOSIS — M25.561 RIGHT KNEE PAIN, UNSPECIFIED CHRONICITY: Primary | ICD-10-CM

## 2025-04-10 ENCOUNTER — HOSPITAL ENCOUNTER (OUTPATIENT)
Dept: MAMMOGRAPHY | Facility: HOSPITAL | Age: 70
Discharge: HOME | End: 2025-04-10
Admitting: PHYSICIAN ASSISTANT
Payer: MEDICARE

## 2025-04-10 DIAGNOSIS — Z12.31 VISIT FOR SCREENING MAMMOGRAM: ICD-10-CM

## 2025-04-10 PROCEDURE — 77067 SCR MAMMO BI INCL CAD: CPT

## 2025-04-10 PROCEDURE — 77063 BREAST TOMOSYNTHESIS BI: CPT

## 2025-04-14 ENCOUNTER — HOSPITAL ENCOUNTER (OUTPATIENT)
Dept: GENERAL RADIOLOGY | Facility: HOSPITAL | Age: 70
Discharge: HOME | End: 2025-04-14
Admitting: ORTHOPAEDIC SURGERY
Payer: MEDICARE

## 2025-04-14 ENCOUNTER — OFFICE VISIT (OUTPATIENT)
Dept: ORTHOPEDIC SURGERY | Facility: CLINIC | Age: 70
End: 2025-04-14
Payer: MEDICARE

## 2025-04-14 VITALS
BODY MASS INDEX: 31.22 KG/M2 | DIASTOLIC BLOOD PRESSURE: 81 MMHG | HEART RATE: 103 BPM | WEIGHT: 159 LBS | SYSTOLIC BLOOD PRESSURE: 139 MMHG | HEIGHT: 60 IN

## 2025-04-14 DIAGNOSIS — M17.11 PRIMARY OSTEOARTHRITIS OF RIGHT KNEE: Primary | ICD-10-CM

## 2025-04-14 DIAGNOSIS — M25.561 RIGHT KNEE PAIN, UNSPECIFIED CHRONICITY: ICD-10-CM

## 2025-04-14 PROCEDURE — 73562 X-RAY EXAM OF KNEE 3: CPT

## 2025-04-14 RX ORDER — FOLIC ACID 1 MG/1
1000 TABLET ORAL DAILY
COMMUNITY

## 2025-04-14 RX ORDER — CHOLECALCIFEROL (VITAMIN D3) 1250 MCG
1 CAPSULE ORAL WEEKLY
COMMUNITY
Start: 2025-02-11

## 2025-04-14 RX ORDER — GABAPENTIN 600 MG/1
1 TABLET ORAL 3 TIMES DAILY
COMMUNITY
Start: 2025-04-04

## 2025-04-14 RX ORDER — ATORVASTATIN CALCIUM 40 MG/1
40 TABLET, FILM COATED ORAL DAILY
COMMUNITY

## 2025-04-14 RX ORDER — CITALOPRAM HYDROBROMIDE 40 MG/1
TABLET ORAL
COMMUNITY

## 2025-04-14 RX ORDER — AMITRIPTYLINE HYDROCHLORIDE 100 MG/1
100 TABLET ORAL
COMMUNITY
Start: 2025-04-07

## 2025-04-14 RX ORDER — FUROSEMIDE 20 MG/1
20 TABLET ORAL 2 TIMES DAILY
COMMUNITY

## 2025-04-14 RX ADMIN — METHYLPREDNISOLONE ACETATE 40 MG: 40 INJECTION, SUSPENSION INTRA-ARTICULAR; INTRALESIONAL; INTRAMUSCULAR; SOFT TISSUE at 11:59

## 2025-04-14 RX ADMIN — LIDOCAINE HYDROCHLORIDE 5 ML: 10 INJECTION, SOLUTION EPIDURAL; INFILTRATION; INTRACAUDAL; PERINEURAL at 11:59

## 2025-04-14 NOTE — PROGRESS NOTES
Detail Level: Zone New Patient Visit      Patient: Winter Ayers  YOB: 1955  Date of Encounter: 04/14/2025        Chief Complaint:   Chief Complaint   Patient presents with    Right Knee - Initial Evaluation, Pain           HPI:   Winter Ayers, 70 y.o. female, referred by Riya Morocho PA-C presents for evaluation of right knee pain.  She relates her current symptoms to an episode about 8 weeks ago when she fell landing directly on her right knee on her front porch.  She complains of grinding sensation in her knee present before her fall.  Now she has increased pain feels more unstable and has had several falls.  Her past medical history is remarkable for rheumatoid arthritis.        Active Problem List:  Patient Active Problem List   Diagnosis    Nasal septal deviation    Nasal valve collapse    Hypertrophy of both inferior nasal turbinates    Rheumatoid arthritis of multiple sites without rheumatoid factor    MDD (major depressive disorder)    Major depressive disorder, recurrent episode, severe, with psychosis    Post traumatic stress disorder (PTSD)    Hiatal hernia with GERD    Lumbar back pain    Cervical back pain with evidence of disc disease    Chest pain    Hypothyroidism           Past Medical History:  Past Medical History:   Diagnosis Date    DDD (degenerative disc disease), cervical     Deviated septum     Disease of thyroid gland     Hiatal hernia     Hiatal hernia with GERD 7/26/2022    Hyperlipidemia     Injury of back     Neuropathy     PTSD (post-traumatic stress disorder)     Rheumatoid aortitis            Past Surgical History:  Past Surgical History:   Procedure Laterality Date    BACK SURGERY      BLADDER SURGERY      FRACTURE SURGERY      RIGHT ANKLE    GASTRIC BYPASS      HERNIA REPAIR      HYSTERECTOMY      NECK SURGERY      SEPTOPLASTY, RESECTION INFERIOR TURBINATES Bilateral 10/24/2018    Procedure: SEPTOPLASTY,  INFERIOR TURBINATE lateralization  WITH LATERA IMPLANTS;   Surgeon: Josh Foy MD;  Location: [facility] OR;  Service: ENT           Family History:  Family History   Problem Relation Age of Onset    Breast cancer Mother     Heart disease Mother     Lung disease Mother     Lung disease Father     Heart disease Father     Alcohol abuse Sister     Breast cancer Sister          Social History:  Social History     Socioeconomic History    Marital status: Single    Number of children: 1    Highest education level: High school graduate   Tobacco Use    Smoking status: Former     Current packs/day: 1.00     Average packs/day: 1 pack/day for 1 year (1.0 ttl pk-yrs)     Types: Cigarettes    Smokeless tobacco: Never   Vaping Use    Vaping status: Never Used   Substance and Sexual Activity    Alcohol use: No    Drug use: No    Sexual activity: Defer     Body mass index is 31.05 kg/m².      Medications:  Current Outpatient Medications   Medication Sig Dispense Refill    amitriptyline (ELAVIL) 100 MG tablet Take 1 tablet by mouth every night at bedtime.      ARIPiprazole (ABILIFY) 2 MG tablet Take 1 tablet by mouth Daily. Indications: Major Depressive Disorder 30 tablet 0    atorvastatin (LIPITOR) 40 MG tablet Take 1 tablet by mouth Daily.      Cholecalciferol (Vitamin D3) 1.25 MG (38267 UT) capsule Take 1 capsule by mouth 1 (One) Time Per Week.      citalopram (CeleXA) 40 MG tablet       Diclofenac Sodium (VOLTAREN) 1 % gel gel Apply 2 g topically to the appropriate area as directed 4 (Four) Times a Day As Needed (pain).      folic acid (FOLVITE) 1 MG tablet Take 1 tablet by mouth Daily.      furosemide (LASIX) 20 MG tablet Take 1 tablet by mouth 2 (Two) Times a Day.      gabapentin (NEURONTIN) 600 MG tablet Take 1 tablet by mouth 3 times a day.      levothyroxine (SYNTHROID, LEVOTHROID) 50 MCG tablet Take 1 tablet by mouth Daily. Indications: Underactive Thyroid      mirtazapine (REMERON) 30 MG tablet Take 1 tablet by mouth Every Night. Indications: Major Depressive Disorder 30  tablet 0    pantoprazole (PROTONIX) 40 MG EC tablet Take 1 tablet by mouth 2 (Two) Times a Day. Indications: Gastroesophageal Reflux Disease      polyvinyl alcohol (LIQUIFILM) 1.4 % ophthalmic solution Administer 1 drop to both eyes As Needed for Dry Eyes. Indications: Dry eyes      saline (AYR) gel nasal gel Apply 1 Application topically to the appropriate area as directed As Needed (dry nasal passages). Indications: Nasal congestion      sertraline (ZOLOFT) 100 MG tablet Take 1 tablet by mouth Daily. Indications: Major Depressive Disorder 30 tablet 0    tiZANidine (ZANAFLEX) 4 MG tablet TAKE ONE TABLET BY MOUTH EVERY 6 TO 8 HOURS AS NEEDED. do not exceed 3 doses in 24 hours       No current facility-administered medications for this visit.         Allergies:  Allergies   Allergen Reactions    Hydroxychloroquine Sulfate Unknown - Low Severity     Patient does not know    Pregabalin Unknown - Low Severity     Patient does not know    Sulfa Antibiotics Unknown - Low Severity     Patient does not know    Ultram [Tramadol Hcl] Itching         Review of Systems:   Review of Systems   Constitutional: Negative.  Negative for chills, fatigue and fever.   HENT: Negative.  Negative for congestion, ear pain, facial swelling, sore throat, trouble swallowing and voice change.    Eyes:  Negative for pain, discharge, redness and visual disturbance.   Respiratory: Negative.  Negative for apnea, cough, choking, chest tightness, shortness of breath, wheezing and stridor.    Cardiovascular:  Positive for leg swelling. Negative for chest pain and palpitations.   Gastrointestinal:  Positive for abdominal pain and nausea. Negative for abdominal distention, blood in stool and vomiting.   Endocrine: Negative.  Negative for cold intolerance, heat intolerance, polydipsia and polyphagia.   Genitourinary: Negative.  Negative for difficulty urinating, dysuria, flank pain, frequency and hematuria.   Musculoskeletal:  Positive for arthralgias,  "back pain, joint swelling and neck pain.   Skin: Negative.  Negative for color change, pallor, rash and wound.   Allergic/Immunologic: Negative.  Negative for environmental allergies, food allergies and immunocompromised state.   Neurological:  Positive for numbness. Negative for dizziness, tremors, seizures, syncope, speech difficulty, weakness, light-headedness and headaches.   Hematological:  Negative for adenopathy. Bruises/bleeds easily.   Psychiatric/Behavioral:  Positive for dysphoric mood. Negative for behavioral problems, confusion, self-injury, sleep disturbance and suicidal ideas. The patient is nervous/anxious.          Physical Exam:   Physical Exam  GENERAL: 70 y.o. female, alert and oriented X 3 in no acute distress.   Visit Vitals  /81   Pulse 103   Ht 152.4 cm (60\")   Wt 72.1 kg (159 lb)   BMI 31.05 kg/m²       GENERAL APPEARANCE: Awake, alert & oriented, in no acute distress and well developed, well nourished.   PSYCH: Normal mood and affect  LUNGS: Breathing nonlabored, no wheezing  EYES: Sclera anicteric, pupils equal  CARDIOVASCULAR: Palpable pulses. Capillary refill less than 2 seconds  INTEGUMENTARY: Skin intact, co clubbing, cyanosis  NEUROLOGIC: Normal Sensation  MUSCULOSKELETAL:  Orthopedic Examination: Right knee demonstrates mild effusion with moderate medial joint line tenderness full flexion and extension moderate crepitus patellofemoral joint with normal tracking normal neurovascular status.          Radiology/Labs:     XR Knee 3 View Right  Result Date: 4/15/2025    No acute findings in the right knee.  This report was finalized on 4/15/2025 8:22 AM by Dr. Pierce Dwyer MD.      Mammo Screening Digital Tomosynthesis Bilateral With CAD  Result Date: 4/10/2025  BIRADS 2: Benign findings  RECOMMENDATION: 1. Recommend routine follow-up for continued breast evaluation. 2. Recommend MRI of the breast as a screening modality, given the patient's strong family history of breast cancer  " Today's study was evaluated in conjunction with computer aided detection.  PLEASE NOTE THE FOLLOWING ACR RECOMMENDATIONS: A:  Only 80% of breast cancers can be diagnosed by mammography. B:  A negative mammogram does not exclude cancer in clinically palpable abnormalities.  Biopsy should be done based on ultrasound findings and clinical judgment. C:  A mammogram has limited value in detecting cancer in patients with adenosis or dense breasts.  [employer] RADI[employer] GUIDELINES For breast cancer detection in asymptomatic women: Age  ACR Recommendations 35-40  Baseline Mammogram 40-49  Annual or Biannual Mammogram 50+  Annual Mammogram   Annual physical and frequent self breast examination.  Patient has been entered into an automatic reminder system.  This report was finalized on 4/10/2025 11:10 AM by Dr. Fito Hart MD.            Radiographs right knee obtained and reviewed show mild irregularity of the femoral joint consistent with osteoarthritis also mild squaring of the medial femoral condyle.          Assessment & Plan:   70 y.o. female presents for worsening right knee pain after fall directly on her knee suspicious for fusion patellofemoral joint mild osteoarthritis present radiographically today she underwent aspiration removing 15 cc of serous fluid injecting Depo-Medrol 40 mg intra-articular.  She will return in 6 weeks.        ICD-10-CM ICD-9-CM   1. Primary osteoarthritis of right knee  M17.11 715.16         Large Joint Arthrocentesis: R knee  Date/Time: 4/14/2025 11:59 AM  Consent given by: patient  Site marked: site marked  Timeout: Immediately prior to procedure a time out was called to verify the correct patient, procedure, equipment, support staff and site/side marked as required   Supporting Documentation  Indications: pain   Procedure Details  Location: knee - R knee  Preparation: Patient was prepped and draped in the usual sterile fashion  Needle size: 18 G  Approach:  anterolateral  Medications administered: 40 mg methylPREDNISolone acetate 40 MG/ML; 5 mL lidocaine PF 1% 1 %  Aspirate amount: 15 mL  Aspirate: serous  Patient tolerance: patient tolerated the procedure well with no immediate complications        Cc:   Riya Morocho PA-C                This document has been electronically signed by Hunter Woods MD   April 16, 2025 11:59 EDT       When Should The Patient Follow-Up For Their Next Full-Body Skin Exam?: 3 Months Quality 137: Melanoma: Continuity Of Care - Recall System: Patient information entered into a recall system that includes: target date for the next exam specified AND a process to follow up with patients regarding missed or unscheduled appointments

## 2025-04-15 PROCEDURE — 73562 X-RAY EXAM OF KNEE 3: CPT | Performed by: RADIOLOGY

## 2025-04-23 RX ORDER — METHYLPREDNISOLONE ACETATE 40 MG/ML
40 INJECTION, SUSPENSION INTRA-ARTICULAR; INTRALESIONAL; INTRAMUSCULAR; SOFT TISSUE
Status: COMPLETED | OUTPATIENT
Start: 2025-04-14 | End: 2025-04-14

## 2025-04-23 RX ORDER — LIDOCAINE HYDROCHLORIDE 10 MG/ML
5 INJECTION, SOLUTION EPIDURAL; INFILTRATION; INTRACAUDAL; PERINEURAL
Status: COMPLETED | OUTPATIENT
Start: 2025-04-14 | End: 2025-04-14

## 2025-05-01 ENCOUNTER — PATIENT ROUNDING (BHMG ONLY) (OUTPATIENT)
Dept: ORTHOPEDIC SURGERY | Facility: CLINIC | Age: 70
End: 2025-05-01
Payer: MEDICARE

## 2025-05-01 NOTE — PROGRESS NOTES
May 1, 2025    Hello, may I speak with Winter Ayers?    My name is Deepthi SANDOVAL,      I am  with [facility]  [facility]  [address]  [place] [address] [phone].    Before we get started may I verify your date of birth? 1955    I am calling to officially welcome you to our practice and ask about your recent visit. Is this a good time to talk? yes    Tell me about your visit with us. What things went well?  Everything went well.        We're always looking for ways to make our patients' experiences even better. Do you have recommendations on ways we may improve?  no    Overall were you satisfied with your first visit to our practice? yes       I appreciate you taking the time to speak with me today. Is there anything else I can do for you? no      Thank you, and have a great day.

## 2025-06-09 ENCOUNTER — OFFICE VISIT (OUTPATIENT)
Dept: ORTHOPEDIC SURGERY | Facility: CLINIC | Age: 70
End: 2025-06-09
Payer: MEDICARE

## 2025-06-09 VITALS — WEIGHT: 158.95 LBS | BODY MASS INDEX: 31.21 KG/M2 | HEIGHT: 60 IN

## 2025-06-09 DIAGNOSIS — M25.561 RIGHT KNEE PAIN, UNSPECIFIED CHRONICITY: ICD-10-CM

## 2025-06-09 DIAGNOSIS — S83.206D POSITIVE MCMURRAY TEST OF RIGHT KNEE, SUBSEQUENT ENCOUNTER: Primary | ICD-10-CM

## 2025-06-09 NOTE — PROGRESS NOTES
Follow-up Visit         Patient: Winter Ayers  YOB: 1955  Date of Encounter: 06/09/2025      Chief  Complaint:   Chief Complaint   Patient presents with    Right Knee - Pain, Edema, Follow-up         HPI:  Winter Ayers, 70 y.o. female presents in follow-up knee pain which began approximately 4 months ago she describes falling on her porch directly on her right knee she has continued to have pain right knee worse than it was prior to her fall.  She feels unstable and she has had numerous falls now attributes to her knee giving way; the most recent of those was 1 week ago. She presents today with contusions to the anterior aspect of her left knee.  She is known to have rheumatoid arthritis.  She was provided intra-articular steroid injection last visit, April 14, 2025 she describes relief of about 1 day.        Medical History:  Patient Active Problem List   Diagnosis    Nasal septal deviation    Nasal valve collapse    Hypertrophy of both inferior nasal turbinates    Rheumatoid arthritis of multiple sites without rheumatoid factor    MDD (major depressive disorder)    Major depressive disorder, recurrent episode, severe, with psychosis    Post traumatic stress disorder (PTSD)    Hiatal hernia with GERD    Lumbar back pain    Cervical back pain with evidence of disc disease    Chest pain    Hypothyroidism     Past Medical History:   Diagnosis Date    DDD (degenerative disc disease), cervical     Deviated septum     Disease of thyroid gland     Hiatal hernia     Hiatal hernia with GERD 7/26/2022    Hyperlipidemia     Injury of back     Neuropathy     PTSD (post-traumatic stress disorder)     Rheumatoid aortitis            Social History:  Social History     Socioeconomic History    Marital status: Single    Number of children: 1    Highest education level: High school graduate   Tobacco Use    Smoking status: Former     Current packs/day: 1.00     Average packs/day: 1 pack/day for 1  year (1.0 ttl pk-yrs)     Types: Cigarettes    Smokeless tobacco: Never   Vaping Use    Vaping status: Never Used   Substance and Sexual Activity    Alcohol use: No    Drug use: No    Sexual activity: Defer           Current Medications:    Current Outpatient Medications:     amitriptyline (ELAVIL) 100 MG tablet, Take 1 tablet by mouth every night at bedtime., Disp: , Rfl:     ARIPiprazole (ABILIFY) 2 MG tablet, Take 1 tablet by mouth Daily. Indications: Major Depressive Disorder, Disp: 30 tablet, Rfl: 0    atorvastatin (LIPITOR) 40 MG tablet, Take 1 tablet by mouth Daily., Disp: , Rfl:     Cholecalciferol (Vitamin D3) 1.25 MG (85511 UT) capsule, Take 1 capsule by mouth 1 (One) Time Per Week., Disp: , Rfl:     citalopram (CeleXA) 40 MG tablet, , Disp: , Rfl:     Diclofenac Sodium (VOLTAREN) 1 % gel gel, Apply 2 g topically to the appropriate area as directed 4 (Four) Times a Day As Needed (pain)., Disp: , Rfl:     folic acid (FOLVITE) 1 MG tablet, Take 1 tablet by mouth Daily., Disp: , Rfl:     furosemide (LASIX) 20 MG tablet, Take 1 tablet by mouth 2 (Two) Times a Day., Disp: , Rfl:     gabapentin (NEURONTIN) 600 MG tablet, Take 1 tablet by mouth 3 times a day., Disp: , Rfl:     levothyroxine (SYNTHROID, LEVOTHROID) 50 MCG tablet, Take 1 tablet by mouth Daily. Indications: Underactive Thyroid, Disp: , Rfl:     mirtazapine (REMERON) 30 MG tablet, Take 1 tablet by mouth Every Night. Indications: Major Depressive Disorder, Disp: 30 tablet, Rfl: 0    pantoprazole (PROTONIX) 40 MG EC tablet, Take 1 tablet by mouth 2 (Two) Times a Day. Indications: Gastroesophageal Reflux Disease, Disp: , Rfl:     polyvinyl alcohol (LIQUIFILM) 1.4 % ophthalmic solution, Administer 1 drop to both eyes As Needed for Dry Eyes. Indications: Dry eyes, Disp: , Rfl:     saline (AYR) gel nasal gel, Apply 1 Application topically to the appropriate area as directed As Needed (dry nasal passages). Indications: Nasal congestion, Disp: , Rfl:      "sertraline (ZOLOFT) 100 MG tablet, Take 1 tablet by mouth Daily. Indications: Major Depressive Disorder, Disp: 30 tablet, Rfl: 0    tiZANidine (ZANAFLEX) 4 MG tablet, TAKE ONE TABLET BY MOUTH EVERY 6 TO 8 HOURS AS NEEDED. do not exceed 3 doses in 24 hours, Disp: , Rfl:         Allergies:  Allergies   Allergen Reactions    Hydroxychloroquine Sulfate Unknown - Low Severity     Patient does not know    Pregabalin Unknown - Low Severity     Patient does not know    Sulfa Antibiotics Unknown - Low Severity     Patient does not know    Ultram [Tramadol Hcl] Itching           Family History:  Family History   Problem Relation Age of Onset    Breast cancer Mother     Heart disease Mother     Lung disease Mother     Lung disease Father     Heart disease Father     Alcohol abuse Sister     Breast cancer Sister            Surgical History:  Past Surgical History:   Procedure Laterality Date    BACK SURGERY      BLADDER SURGERY      FRACTURE SURGERY      RIGHT ANKLE    GASTRIC BYPASS      HERNIA REPAIR      HYSTERECTOMY      NECK SURGERY      SEPTOPLASTY, RESECTION INFERIOR TURBINATES Bilateral 10/24/2018    Procedure: SEPTOPLASTY,  INFERIOR TURBINATE lateralization  WITH LATERA IMPLANTS;  Surgeon: Josh Foy MD;  Location: [facility];  Service: ENT         Vitals:  Vitals:    06/09/25 0848   Weight: 72.1 kg (158 lb 15.2 oz)   Height: 152.4 cm (60\")     Body mass index is 31.04 kg/m².          Orthopedic Examination:   Right knee demonstrates an effusion with moderate medial joint line tenderness minimal lateral joint line tenderness, mild patellar crepitance with flexion and extension with no patellar maltracking, moderately positive Wellington's sign neurovascular exam grossly intact.          Assessment & Plan:   70 y.o. female presents history of numerous falls now with description of right knee being unstable contributing to her frequent falls she has normal radiographs right knee by report.  Limited response to " intra-articular steroid injection.  Will request MRI of right knee history of fall with normal radiographs and very response to intra-articular steroid injection.  She will return once completed.         Diagnosis Plan   1. Right knee pain, unspecified chronicity                       Cc:  Riya Morocho, KIRSTY              This document has been electronically signed by Hunter Woods MD   June 9, 2025 10:25 EDT

## 2025-06-18 ENCOUNTER — HOSPITAL ENCOUNTER (OUTPATIENT)
Dept: MRI IMAGING | Facility: HOSPITAL | Age: 70
Discharge: HOME | End: 2025-06-18
Admitting: ORTHOPAEDIC SURGERY
Payer: MEDICARE

## 2025-06-18 DIAGNOSIS — M25.561 RIGHT KNEE PAIN, UNSPECIFIED CHRONICITY: ICD-10-CM

## 2025-06-18 DIAGNOSIS — S83.206D POSITIVE MCMURRAY TEST OF RIGHT KNEE, SUBSEQUENT ENCOUNTER: ICD-10-CM

## 2025-06-18 PROCEDURE — 73721 MRI JNT OF LWR EXTRE W/O DYE: CPT

## 2025-07-09 ENCOUNTER — OFFICE VISIT (OUTPATIENT)
Dept: ORTHOPEDIC SURGERY | Facility: CLINIC | Age: 70
End: 2025-07-09
Payer: MEDICARE

## 2025-07-09 VITALS
SYSTOLIC BLOOD PRESSURE: 113 MMHG | HEART RATE: 96 BPM | DIASTOLIC BLOOD PRESSURE: 69 MMHG | WEIGHT: 158.95 LBS | HEIGHT: 60 IN | BODY MASS INDEX: 31.21 KG/M2

## 2025-07-09 DIAGNOSIS — S83.281D ACUTE LATERAL MENISCUS TEAR OF RIGHT KNEE, SUBSEQUENT ENCOUNTER: ICD-10-CM

## 2025-07-09 DIAGNOSIS — M17.11 PRIMARY OSTEOARTHRITIS OF RIGHT KNEE: Primary | ICD-10-CM

## 2025-07-09 RX ORDER — GABAPENTIN 800 MG/1
1 TABLET ORAL 3 TIMES DAILY
COMMUNITY
Start: 2025-07-03

## 2025-07-09 RX ADMIN — METHYLPREDNISOLONE ACETATE 40 MG: 40 INJECTION, SUSPENSION INTRA-ARTICULAR; INTRALESIONAL; INTRAMUSCULAR; SOFT TISSUE at 09:24

## 2025-07-09 RX ADMIN — LIDOCAINE HYDROCHLORIDE 5 ML: 10 INJECTION, SOLUTION EPIDURAL; INFILTRATION; INTRACAUDAL; PERINEURAL at 09:24
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
## 2025-07-11 RX ORDER — LIDOCAINE HYDROCHLORIDE 10 MG/ML
5 INJECTION, SOLUTION EPIDURAL; INFILTRATION; INTRACAUDAL; PERINEURAL
Status: COMPLETED | OUTPATIENT
Start: 2025-07-09 | End: 2025-07-09

## 2025-07-11 RX ORDER — METHYLPREDNISOLONE ACETATE 40 MG/ML
40 INJECTION, SUSPENSION INTRA-ARTICULAR; INTRALESIONAL; INTRAMUSCULAR; SOFT TISSUE
Status: COMPLETED | OUTPATIENT
Start: 2025-07-09 | End: 2025-07-09

## 2025-08-06 ENCOUNTER — OFFICE VISIT (OUTPATIENT)
Dept: ORTHOPEDIC SURGERY | Facility: CLINIC | Age: 70
End: 2025-08-06
Payer: MEDICARE

## 2025-08-06 VITALS — BODY MASS INDEX: 31.02 KG/M2 | HEIGHT: 60 IN | WEIGHT: 158 LBS

## 2025-08-06 DIAGNOSIS — M17.11 PRIMARY OSTEOARTHRITIS OF RIGHT KNEE: Primary | ICD-10-CM

## 2025-08-06 DIAGNOSIS — S83.241D ACUTE MEDIAL MENISCUS TEAR OF RIGHT KNEE, SUBSEQUENT ENCOUNTER: ICD-10-CM

## 2025-08-06 RX ORDER — POTASSIUM CHLORIDE 750 MG/1
10 TABLET, EXTENDED RELEASE ORAL DAILY
COMMUNITY

## (undated) DEVICE — HOLDER: Brand: DEROYAL

## (undated) DEVICE — PK ENT 70

## (undated) DEVICE — SUT NLY 2/0 664G

## (undated) DEVICE — INTENDED FOR TISSUE SEPARATION, AND OTHER PROCEDURES THAT REQUIRE A SHARP SURGICAL BLADE TO PUNCTURE OR CUT.: Brand: BARD-PARKER ® STAINLESS STEEL BLADES

## (undated) DEVICE — MARKR SKIN W/RULR AND LBL

## (undated) DEVICE — SUT GUT CHRM 4/0 FS2 27IN 635H

## (undated) DEVICE — SPLINT 1524050 5PK PAIR DOYLE II AIRWAY: Brand: DOYLE II ™

## (undated) DEVICE — PAD GRND REM POLYHESIVE A/ DISP

## (undated) DEVICE — SPLINT 1524055 DOYLE II AIRWAY SET: Brand: DOYLE II ™

## (undated) DEVICE — SUT GUT PLN FAST ABS 5/0 PC1 18IN 1915G

## (undated) DEVICE — SPNG GZ WOVN 4X4IN 12PLY 10/BX STRL

## (undated) DEVICE — APPL COTN TP PLSTC 6IN STRL LF PK/2

## (undated) DEVICE — SPLINT 1522000 20PK PAIR SIMPLESPLINTS

## (undated) DEVICE — LARYNG GLIDESCOPE LOPRO SPECTR SU S3